# Patient Record
Sex: MALE | Race: OTHER | Employment: UNEMPLOYED | URBAN - METROPOLITAN AREA
[De-identification: names, ages, dates, MRNs, and addresses within clinical notes are randomized per-mention and may not be internally consistent; named-entity substitution may affect disease eponyms.]

---

## 2022-07-05 ENCOUNTER — APPOINTMENT (OUTPATIENT)
Dept: CT IMAGING | Age: 37
DRG: 385 | End: 2022-07-05
Payer: MEDICAID

## 2022-07-05 ENCOUNTER — HOSPITAL ENCOUNTER (INPATIENT)
Age: 37
LOS: 3 days | Discharge: HOME | DRG: 385 | End: 2022-07-08
Attending: EMERGENCY MEDICINE | Admitting: FAMILY MEDICINE
Payer: MEDICAID

## 2022-07-05 DIAGNOSIS — R22.2 CHEST MASS: ICD-10-CM

## 2022-07-05 DIAGNOSIS — R22.1 NECK MASS: Primary | ICD-10-CM

## 2022-07-05 LAB
A/G RATIO: 1 (ref 1.1–2.2)
ACANTHOCYTES: ABNORMAL
ALBUMIN SERPL-MCNC: 3.7 G/DL (ref 3.4–5)
ALP BLD-CCNC: 62 U/L (ref 40–129)
ALT SERPL-CCNC: 8 U/L (ref 10–40)
ANION GAP SERPL CALCULATED.3IONS-SCNC: 11 MMOL/L (ref 3–16)
AST SERPL-CCNC: 11 U/L (ref 15–37)
BASOPHILS ABSOLUTE: 0 K/UL (ref 0–0.2)
BASOPHILS RELATIVE PERCENT: 0.4 %
BILIRUB SERPL-MCNC: <0.2 MG/DL (ref 0–1)
BILIRUBIN URINE: NEGATIVE
BLOOD, URINE: NEGATIVE
BUN BLDV-MCNC: 8 MG/DL (ref 7–20)
CALCIUM SERPL-MCNC: 9.4 MG/DL (ref 8.3–10.6)
CHLORIDE BLD-SCNC: 104 MMOL/L (ref 99–110)
CLARITY: CLEAR
CO2: 26 MMOL/L (ref 21–32)
COLOR: YELLOW
CREAT SERPL-MCNC: 0.7 MG/DL (ref 0.9–1.3)
EOSINOPHILS ABSOLUTE: 0.2 K/UL (ref 0–0.6)
EOSINOPHILS RELATIVE PERCENT: 2.6 %
GFR AFRICAN AMERICAN: >60
GFR NON-AFRICAN AMERICAN: >60
GLUCOSE BLD-MCNC: 101 MG/DL (ref 70–99)
GLUCOSE URINE: NEGATIVE MG/DL
HCT VFR BLD CALC: 28.4 % (ref 40.5–52.5)
HEMATOLOGY PATH CONSULT: YES
HEMOGLOBIN: 9 G/DL (ref 13.5–17.5)
HYPOCHROMIA: ABNORMAL
KETONES, URINE: NEGATIVE MG/DL
LACTIC ACID: 1.2 MMOL/L (ref 0.4–2)
LEUKOCYTE ESTERASE, URINE: NEGATIVE
LYMPHOCYTES ABSOLUTE: 1.2 K/UL (ref 1–5.1)
LYMPHOCYTES RELATIVE PERCENT: 14.8 %
MCH RBC QN AUTO: 21.2 PG (ref 26–34)
MCHC RBC AUTO-ENTMCNC: 31.7 G/DL (ref 31–36)
MCV RBC AUTO: 66.9 FL (ref 80–100)
MICROCYTES: ABNORMAL
MICROSCOPIC EXAMINATION: NORMAL
MONOCYTES ABSOLUTE: 0.5 K/UL (ref 0–1.3)
MONOCYTES RELATIVE PERCENT: 6.3 %
NEUTROPHILS ABSOLUTE: 6.3 K/UL (ref 1.7–7.7)
NEUTROPHILS RELATIVE PERCENT: 75.9 %
NITRITE, URINE: NEGATIVE
OVALOCYTES: ABNORMAL
PDW BLD-RTO: 17.7 % (ref 12.4–15.4)
PH UA: 6 (ref 5–8)
PLATELET # BLD: 328 K/UL (ref 135–450)
PMV BLD AUTO: 6.9 FL (ref 5–10.5)
POLYCHROMASIA: ABNORMAL
POTASSIUM REFLEX MAGNESIUM: 3.6 MMOL/L (ref 3.5–5.1)
PROTEIN UA: NEGATIVE MG/DL
RBC # BLD: 4.25 M/UL (ref 4.2–5.9)
SARS-COV-2, NAAT: NOT DETECTED
SODIUM BLD-SCNC: 141 MMOL/L (ref 136–145)
SPECIFIC GRAVITY UA: 1.01 (ref 1–1.03)
TOTAL PROTEIN: 7.3 G/DL (ref 6.4–8.2)
URINE REFLEX TO CULTURE: NORMAL
URINE TYPE: NORMAL
UROBILINOGEN, URINE: 0.2 E.U./DL
WBC # BLD: 8.3 K/UL (ref 4–11)

## 2022-07-05 PROCEDURE — 87635 SARS-COV-2 COVID-19 AMP PRB: CPT

## 2022-07-05 PROCEDURE — 2580000003 HC RX 258: Performed by: EMERGENCY MEDICINE

## 2022-07-05 PROCEDURE — 83605 ASSAY OF LACTIC ACID: CPT

## 2022-07-05 PROCEDURE — 93005 ELECTROCARDIOGRAM TRACING: CPT | Performed by: EMERGENCY MEDICINE

## 2022-07-05 PROCEDURE — 99285 EMERGENCY DEPT VISIT HI MDM: CPT

## 2022-07-05 PROCEDURE — 2060000000 HC ICU INTERMEDIATE R&B

## 2022-07-05 PROCEDURE — 36415 COLL VENOUS BLD VENIPUNCTURE: CPT

## 2022-07-05 PROCEDURE — 80053 COMPREHEN METABOLIC PANEL: CPT

## 2022-07-05 PROCEDURE — 70491 CT SOFT TISSUE NECK W/DYE: CPT

## 2022-07-05 PROCEDURE — 71260 CT THORAX DX C+: CPT

## 2022-07-05 PROCEDURE — 81003 URINALYSIS AUTO W/O SCOPE: CPT

## 2022-07-05 PROCEDURE — 85025 COMPLETE CBC W/AUTO DIFF WBC: CPT

## 2022-07-05 PROCEDURE — 2580000003 HC RX 258: Performed by: NURSE PRACTITIONER

## 2022-07-05 PROCEDURE — 6360000004 HC RX CONTRAST MEDICATION: Performed by: EMERGENCY MEDICINE

## 2022-07-05 PROCEDURE — 6360000002 HC RX W HCPCS: Performed by: NURSE PRACTITIONER

## 2022-07-05 RX ORDER — ACETAMINOPHEN 325 MG/1
650 TABLET ORAL EVERY 6 HOURS PRN
Status: DISCONTINUED | OUTPATIENT
Start: 2022-07-05 | End: 2022-07-08 | Stop reason: HOSPADM

## 2022-07-05 RX ORDER — ONDANSETRON 2 MG/ML
4 INJECTION INTRAMUSCULAR; INTRAVENOUS EVERY 6 HOURS PRN
Status: DISCONTINUED | OUTPATIENT
Start: 2022-07-05 | End: 2022-07-08 | Stop reason: HOSPADM

## 2022-07-05 RX ORDER — 0.9 % SODIUM CHLORIDE 0.9 %
1000 INTRAVENOUS SOLUTION INTRAVENOUS ONCE
Status: COMPLETED | OUTPATIENT
Start: 2022-07-05 | End: 2022-07-05

## 2022-07-05 RX ORDER — ACETAMINOPHEN 650 MG/1
650 SUPPOSITORY RECTAL EVERY 6 HOURS PRN
Status: DISCONTINUED | OUTPATIENT
Start: 2022-07-05 | End: 2022-07-08 | Stop reason: HOSPADM

## 2022-07-05 RX ORDER — ONDANSETRON 4 MG/1
4 TABLET, ORALLY DISINTEGRATING ORAL EVERY 8 HOURS PRN
Status: DISCONTINUED | OUTPATIENT
Start: 2022-07-05 | End: 2022-07-08 | Stop reason: HOSPADM

## 2022-07-05 RX ORDER — ENOXAPARIN SODIUM 100 MG/ML
30 INJECTION SUBCUTANEOUS 2 TIMES DAILY
Status: DISCONTINUED | OUTPATIENT
Start: 2022-07-05 | End: 2022-07-08 | Stop reason: HOSPADM

## 2022-07-05 RX ORDER — SODIUM CHLORIDE 0.9 % (FLUSH) 0.9 %
5-40 SYRINGE (ML) INJECTION PRN
Status: DISCONTINUED | OUTPATIENT
Start: 2022-07-05 | End: 2022-07-08 | Stop reason: HOSPADM

## 2022-07-05 RX ORDER — SODIUM CHLORIDE 0.9 % (FLUSH) 0.9 %
5-40 SYRINGE (ML) INJECTION EVERY 12 HOURS SCHEDULED
Status: DISCONTINUED | OUTPATIENT
Start: 2022-07-05 | End: 2022-07-08 | Stop reason: HOSPADM

## 2022-07-05 RX ORDER — SODIUM CHLORIDE 9 MG/ML
INJECTION, SOLUTION INTRAVENOUS PRN
Status: DISCONTINUED | OUTPATIENT
Start: 2022-07-05 | End: 2022-07-08 | Stop reason: HOSPADM

## 2022-07-05 RX ORDER — POLYETHYLENE GLYCOL 3350 17 G/17G
17 POWDER, FOR SOLUTION ORAL DAILY PRN
Status: DISCONTINUED | OUTPATIENT
Start: 2022-07-05 | End: 2022-07-08 | Stop reason: HOSPADM

## 2022-07-05 RX ADMIN — IOPAMIDOL 100 ML: 755 INJECTION, SOLUTION INTRAVENOUS at 14:35

## 2022-07-05 RX ADMIN — SODIUM CHLORIDE, PRESERVATIVE FREE 10 ML: 5 INJECTION INTRAVENOUS at 22:06

## 2022-07-05 RX ADMIN — SODIUM CHLORIDE 1000 ML: 9 INJECTION, SOLUTION INTRAVENOUS at 13:52

## 2022-07-05 RX ADMIN — ENOXAPARIN SODIUM 30 MG: 100 INJECTION SUBCUTANEOUS at 22:05

## 2022-07-05 ASSESSMENT — PAIN - FUNCTIONAL ASSESSMENT: PAIN_FUNCTIONAL_ASSESSMENT: 0-10

## 2022-07-05 ASSESSMENT — ENCOUNTER SYMPTOMS
COUGH: 0
EYE REDNESS: 0
ABDOMINAL PAIN: 0
SHORTNESS OF BREATH: 0
EYE PAIN: 0
SORE THROAT: 0
BACK PAIN: 0
COLOR CHANGE: 0
ABDOMINAL DISTENTION: 0

## 2022-07-05 ASSESSMENT — PAIN SCALES - GENERAL: PAINLEVEL_OUTOF10: 1

## 2022-07-05 NOTE — ED NOTES
Report called to [facility] KAYODE JEFFERS at [address] at [facility]. Report also given to Neto Joseph in the ED at [facility], [facility]  07/05/22 [facility]  07/05/22 4827

## 2022-07-05 NOTE — ED NOTES
[facility] here for patient transport, report given to EMS. All questions answered. No change in pt's status.       Ysabel Vargas, RN  70/41/16 1920

## 2022-07-05 NOTE — ED NOTES
Out of room to [facility] 0166 via [facility] via stretcher.      Raine Yousif RN  92/01/47 1946

## 2022-07-05 NOTE — ED PROVIDER NOTES
[address] [place] [address]      Pt Name: Anastasia Carrillo  MRN: [id number]  [place] 1985  Date of evaluation: 7/5/2022  Provider: Viktoriya Puckett MD    [address] [place] [address]       Chief Complaint   Patient presents with    Rash     has a rash on the upper chest, across the neck, radiating to the back of the neck, and to the scalp as well, states first noticed 1 week ago    Mass     has an abcess to the left side of neck         HISTORY OF PRESENT ILLNESS   (Location/Symptom, Timing/Onset, Context/Setting, Quality, Duration, Modifying Factors, Severity)  Note limiting factors. Anastasia Carrillo is a 29 y.o. male who presents to the emergency department Complaining of mass in the left side of his neck. Patient stated started about 3 to 4 weeks ago. Is gotten progressively worse he noticed some flaky skin in his scalp 2 and now has a rash on his anterior chest wall right below the mass. Patient denies any chest pain or shortness of breath. Patient denies any fevers or chills. He denies any recent weight loss he has actually had a lot of weight gain since COVID started. Patient denies any fevers or chills. He specifically denies any IV drug use. Nursing Notes were reviewed. REVIEW OF SYSTEMS    (2-9 systems for level 4, 10 or more for level 5)     Review of Systems   Constitutional: Negative for chills and fever. HENT: Negative for congestion and sore throat. Eyes: Negative for pain and redness. Respiratory: Negative for cough and shortness of breath. Cardiovascular: Negative for chest pain. Gastrointestinal: Negative for abdominal distention and abdominal pain. Genitourinary: Negative for difficulty urinating and dysuria. Musculoskeletal: Negative for arthralgias and back pain. Skin: Positive for rash. Negative for color change. Neurological: Negative for dizziness, weakness and numbness.    Psychiatric/Behavioral: Negative for agitation and behavioral problems. Except as noted above the remainder of the review of systems was reviewed and negative. PAST MEDICAL HISTORY     Past Medical History:   Diagnosis Date    Depression     Mediastinal mass 2022    Obesity     Rash 2022    neck, scalp, anterior chest         SURGICAL HISTORY       Past Surgical History:   Procedure Laterality Date    CT BIOPSY LYMPH NODES SUPERFICIAL  2022    CT BIOPSY LYMPH NODES SUPERFICIAL 2022 [facility] CT         CURRENT MEDICATIONS       Discharge Medication List as of 2022  3:17 PM          ALLERGIES     Patient has no known allergies. FAMILY HISTORY       Family History   Problem Relation Age of Onset    Kidney Disease Father         on HD    Diabetes Father     Hypertension Father     Obesity Brother           SOCIAL HISTORY       Social History     Socioeconomic History    Marital status: Single     Spouse name: None    Number of children: None    Years of education: None    Highest education level: None   Occupational History    None   Tobacco Use    Smoking status: Former Smoker     Packs/day: 1.00     Years: 1.00     Pack years: 1.00     Start date: 2007     Quit date: 2008     Years since quittin.5    Smokeless tobacco: Never Used    Tobacco comment: last use in    Substance and Sexual Activity    Alcohol use: Not Currently     Comment: last use 5 years ago    Drug use: Not Currently     Types: Marijuana (Arlis Blunt)     Comment: last use 5 years ago    Sexual activity: None   Other Topics Concern    None   Social History Narrative    None     Social Determinants of Health     Financial Resource Strain:     Difficulty of Paying Living Expenses: Not on file   Food Insecurity:     Worried About Running Out of Food in the Last Year: Not on file    June of Food in the Last Year: Not on file   Transportation Needs:     Lack of Transportation (Medical):  Not on file    Lack of Transportation (Non-Medical): Not on file   Physical Activity:     Days of Exercise per Week: Not on file    Minutes of Exercise per Session: Not on file   Stress:     Feeling of Stress : Not on file   Social Connections:     Frequency of Communication with Friends and Family: Not on file    Frequency of Social Gatherings with Friends and Family: Not on file    Attends Yazdanism Services: Not on file    Active Member of [address] [place] [address] or Organizations: Not on file    Attends Club or Organization Meetings: Not on file    Marital Status: Not on file   Intimate Partner Violence:     Fear of Current or Ex-Partner: Not on file    Emotionally Abused: Not on file    Physically Abused: Not on file    Sexually Abused: Not on file   Housing Stability:     Unable to Pay for Housing in the Last Year: Not on file    Number of [place] in the Last Year: Not on file    Unstable Housing in the Last Year: Not on file       SCREENINGS         Hansa Coma Scale  Eye Opening: Spontaneous  Best Verbal Response: Oriented  Best Motor Response: Obeys commands  Parker Coma Scale Score: 15                     CIWA Assessment  BP: 118/73  Heart Rate: 98                 PHYSICAL EXAM    (up to 7 for level 4, 8 or more for level 5)     ED Triage Vitals [07/05/22 1235]   BP Temp Temp Source Heart Rate Resp SpO2 Height Weight   (!) 136/97 99.9 °F (37.7 °C) Oral (!) 120 -- 99 % 5' 5\" (1.651 m) 261 lb 3.9 oz (118.5 kg)       Physical Exam  Vitals and nursing note reviewed. Constitutional:       Appearance: Normal appearance. He is obese. HENT:      Head: Normocephalic and atraumatic. Right Ear: Tympanic membrane normal.      Left Ear: Tympanic membrane normal.      Nose: Nose normal.      Mouth/Throat:      Mouth: Mucous membranes are moist.      Pharynx: Oropharynx is clear. Eyes:      Extraocular Movements: Extraocular movements intact. Pupils: Pupils are equal, round, and reactive to light.    Neck:      Comments: Patient has a very hard large mass in the left supraclavicular region. It is very firm  Cardiovascular:      Rate and Rhythm: Normal rate and regular rhythm. Pulses: Normal pulses. Heart sounds: Normal heart sounds. Pulmonary:      Effort: Pulmonary effort is normal.      Breath sounds: Normal breath sounds. Abdominal:      General: Abdomen is flat. Bowel sounds are normal.      Palpations: Abdomen is soft. Musculoskeletal:         General: No tenderness. Normal range of motion. Cervical back: Normal range of motion. Skin:     General: Skin is warm and dry. Capillary Refill: Capillary refill takes less than 2 seconds. Neurological:      General: No focal deficit present. Mental Status: He is alert and oriented to person, place, and time. Psychiatric:         Mood and Affect: Mood normal.         Behavior: Behavior normal.         DIAGNOSTIC RESULTS     EKG: All EKG's are interpreted by the Emergency Department Physician who either signs or Co-signs this chart in the absence of a cardiologist.  The patient's EKG showed a sinus tachycardia with nondiagnostic ST and T wave changes no definite ischemia or injury. The patient had normal CT QRS QT and all axes were normal    RADIOLOGY:   Non-plain film images such as CT, Ultrasound and MRI are read by the radiologist. Plain radiographic images are visualized and preliminarily interpreted by the emergency physician with the below findings:        Interpretation per the Radiologist below, if available at the time of this note:    XR CHEST 1 VIEW   Final Result   No pneumothorax is seen status post biopsy. CT BIOPSY LYMPH NODES SUPERFICIAL   Final Result   Successful CT guided core biopsy anterior mediastinal mass. RECOMMENDATIONS:   Unavailable         CT GUIDED NEEDLE PLACEMENT   Final Result   Successful CT guided core biopsy anterior mediastinal mass.       RECOMMENDATIONS:   Unavailable         CT ABDOMEN PELVIS W IV CONTRAST Additional Contrast? Oral   Final Result   1. No acute findings within the abdomen or pelvis. No pathologic adenopathy   is identified. 2. Mild splenomegaly. 3. Mild hepatic steatosis. CT CHEST W CONTRAST   Final Result   Abnormal soft tissue density seen in the mediastinum, inseparable from the   thymus gland, raising the question of thymoma. Lymphoma would also be in the   differential.  Teratoma is considered less likely, given lack of   calcification internally      RECOMMENDATIONS:   Unavailable         CT SOFT TISSUE NECK W CONTRAST   Final Result   1. Amorphous soft tissue attenuation seen along the left jugular chain lymph   nodes involving is the left sternocleidomastoid muscle with mildly enlarged   left jugular chain and left submental lymph nodes. There is perhaps minimal   asymmetric skin thickening overlying the left sternocleidomastoid muscle. 2. There are multiple abnormal appearing right supraclavicular lymph nodes. 3. No discrete/drainable fluid collection is definitively identified of the   neck. 4. Soft tissue attenuation seen in the anterior mediastinum with enlarged   mediastinal lymph nodes. Please refer to the CT chest for further evaluation.                ED BEDSIDE ULTRASOUND:   Performed by ED Physician - none    LABS:  Labs Reviewed   COMPREHENSIVE METABOLIC PANEL W/ REFLEX TO MG FOR LOW K - Abnormal; Notable for the following components:       Result Value    Glucose 101 (*)     CREATININE 0.7 (*)     Albumin/Globulin Ratio 1.0 (*)     ALT 8 (*)     AST 11 (*)     All other components within normal limits   CBC WITH AUTO DIFFERENTIAL - Abnormal; Notable for the following components:    Hemoglobin 9.0 (*)     Hematocrit 28.4 (*)     MCV 66.9 (*)     MCH 21.2 (*)     RDW 17.7 (*)     Microcytes 2+ (*)     Polychromasia Occasional (*)     Hypochromia 2+ (*)     Acanthocytes Occasional (*)     Ovalocytes Occasional (*)     All other components within normal limits   CBC WITH AUTO DIFFERENTIAL - Abnormal; Notable for the following components:    RBC 4.15 (*)     Hemoglobin 8.9 (*)     Hematocrit 28.1 (*)     MCV 67.9 (*)     MCH 21.5 (*)     RDW 18.0 (*)     All other components within normal limits   COMPREHENSIVE METABOLIC PANEL - Abnormal; Notable for the following components:    Glucose 113 (*)     CREATININE 0.7 (*)     Albumin 3.1 (*)     Albumin/Globulin Ratio 0.9 (*)     ALT 6 (*)     AST 11 (*)     All other components within normal limits   RETICULOCYTES - Abnormal; Notable for the following components:    Immature Retic Fract 0.65 (*)     Hematocrit 32.2 (*)     All other components within normal limits   IRON AND TIBC - Abnormal; Notable for the following components:    Iron 28 (*)     Iron Saturation 8 (*)     All other components within normal limits   HAPTOGLOBIN - Abnormal; Notable for the following components:    Haptoglobin 452.0 (*)     All other components within normal limits   SEDIMENTATION RATE - Abnormal; Notable for the following components:    Sed Rate 128 (*)     All other components within normal limits    Narrative:     Sedimentation Rate was cancelled on 07/06/2022 at 11:40 by Michelle Roe; Rejected:  Quantity not sufficient   PROTIME-INR - Abnormal; Notable for the following components:    Protime 15.3 (*)     INR 1.21 (*)     All other components within normal limits   COVID-19, RAPID   LACTIC ACID   URINALYSIS WITH REFLEX TO CULTURE   HCG, QUANTITATIVE, PREGNANCY   LACTATE DEHYDROGENASE   URIC ACID   AFP TUMOR MARKER   HCG, TUMOR MARKER   FLOW CYTOMETRY LEUKEMIA/LYMPHOMA NODES OR FLUIDS    Narrative:                                                        [facility]                                                     [address] [address] Fax [phone]                                                     [facility] [phone]  Department of Pathology  FINAL FLOW CYTOMETRY REPORT  Patient       Jenna Ventura         Accession     [id number]  Name:                                       No:   Age Sex:  1985    29 Y / M        Location:     [facility] 01  Account No:   [de-identified]                   Collected:    2022  Med Rec No:   [id number]                  Received:     2022  Attend Phys:  TRINA VINCENT DO                 Completed:    2022  Request       TRINA VINCENT DO  Phys:      INTERPRETATION:    Mediastinal Mass: No phenotypic evidence of non-Hodgkin lymphoma. (Results should be interpreted with caution due to low cell count and  viability in the sample submitted.)    Suggest correlation of phenotype with clinical morphologic and other  pertinent laboratory findings, as well as other ancillary studies, as  appropriate. CLINICAL INFORMATION:  Clinical Diagnosis: Thoracic and neck mass  Specimen: Mediastinal mass  Pathology Case #: [id number]  Cell Count: 0.5 x 105  Viability: 0%    DATA ANALYSIS: :  Lymphocytes (rare)  B-cells are polyclonal and T-cells are heterogeneous with no aberrant  antigen loss or expression. CD4/CD8 ratio is 2.96.    ANTIBODIES USED/RESULTS:  FMC7 [*], CD10 [*], CD19 [*], CD20 [*],CD23 [*], Kappa light chain [*],  Lambda light chain [*], CD2 [*], CD3 [*], CD4 [*], CD5 [*], CD7 [*], CD8  [*], CD16 [*], CD56 [*], CD38 [*], HLA-DR [*]    * = No abnormal population identified    Note: These tests were developed and their performance characteristics  determined by [facility]. They have not been cleared or  approved by the U.S. Food and Drug Administration. The FDA has  determined that such clearance or approval is not necessary.     Case signed out at [facility],  [facility] [address] [address]  Technical processing at [facility], [address] [facility] [place] [address]  Phone [phone]  CPT: Marge Lee M.D., PH.D.  (Electronic Signature)  2022                                                                     Page 1 of 1   FERRITIN   VITAMIN B12 & FOLATE   BLOOD SMEAR REVIEW   SURGICAL PATHOLOGY    Narrative:                                          [facility]                                       [address] [facility] [place] [address]                                       Fax [phone]   Ph. [phone]  Department of Pathology  FINAL SURGICAL PATHOLOGY REPORT  Patient Name:  Shahida Cho       Accession No:  [id number]   Age Sex:   1985    29 Y / M      Location:      [facility] 01  Account No:    [de-identified]                 Collected:     2022  Med Rec No:    [id number]                Received:      2022  Attend Phys:   TRINA VINCENT DO               Completed:     2022  Perform Phys:  Christel Lombardi MD           FINAL DIAGNOSIS:    Mediastinal mass, core biopsy:  -Hyalinizing fibrosis. See comment. COMMENT:      The core biopsies show dense hyaline fibrosis with  scattered small lymphocytes that are composed of mixed CD3 positive T  lymphocytes and CD20 positive B cells. Keratin (AE1/AE3) and CD30 are  negative. The findings raise a differential diagnosis of sclerosing  mediastinitis. Reactive change to a neoplastic process is also in the  differential diagnosis. Clinical correlation is recommended. HARJINDER/HARJINDER        Preoperative Diagnosis:  Thoracic and neck mass  Postoperative Diagnosis:   Thoracic and neck mass    SPECIMEN:  MEDIASTINAL MASS BX     GROSS DESCRIPTION:   Specimen is received in formalin labeled with the  patient's name and \"mediastinal mass\" and consists of multiple minuscule  tissue fragments ranging from 0.1 to 0.2 cm.  Filtered and submitted in  toto in one cassette. IMMEDIATE EVALUATION:  Pass #1:  Hypocellular. Pass #2:  Hypocellar and flow. KEI Kaiser/RAFI     MICROSCOPIC DESCRIPTION:  Microscopic examination performed. See  diagnosis and comment. All controls show appropriate reactivity. All immunohistochemical/cytochemical stains (IHC) are performed on  separate slides per different antibody unless otherwise specified in the  documentation that a cocktail (multiple stain) was performed. ANALYTE  SPECIFIC REAGENT (ASR) DISCLAIMER**  The use of one or more reagents in the above mentioned tests is regulated  as an analyte specific reagent (ASR). These tests were developed and  their performance characteristics determined by the clinical Laboratories  of [address] [place] [address] has determined that such clearance  or approval is not necessary. .    CPT: 40571 X1   46730 X3   10109 X1   27705 L8   12675 X1  Copy to: Paula Pagan MD  Case signed out at [facility],  [facility] [facility] [place] [address]  Technical processing at [facility], [facility] [facility] [address] [facility] [address] [place] [address]  Phone [phone]        Myles Barajas M.D., PH.D.  (Electronic Signature)  07/11/2022                                                                     Page 1 of 1   FLOW CYTOMETRY LEUKEMIA/LYMPHOMA NODES OR FLUIDS   DIRECT ANTIGLOBULIN TEST       All other labs were within normal range or not returned as of this dictation. EMERGENCY DEPARTMENT COURSE and DIFFERENTIAL DIAGNOSIS/MDM:   Vitals:    Vitals:    07/08/22 1037 07/08/22 1040 07/08/22 1132 07/08/22 1345   BP: 120/68 122/65 118/73    Pulse: (!) 106 (!) 104 98    Resp: 18 16 16    Temp:   99.1 °F (37.3 °C)    TempSrc:   Oral    SpO2: 96% 97% 99% 99%   Weight:       Height:         Is this patient to be included in the SEP-1 Core Measure due to severe sepsis or septic shock?    No   Exclusion criteria - the patient is NOT to be included for SEP-1 Core Measure due to: Infection is not suspected     MDM  Number of Diagnoses or Management Options  Chest mass  Neck mass  Diagnosis management comments: The patient had the neck mass and chest mass described above. The patient has positive lymph nodes on the right as well as the mass on the left and I am concerned about ultimate airway compromise although at this time he has no airway compromise. The patient was rechecked multiple times and maintained his airway here. The patient discussed with Dr. Niels Padilla who agreed to admit the patient to the hospitalist service and take care of all appropriate consultation she did asked me to test patient for COVID and that test has been sent        REASSESSMENT      Patient was reassessed multiple times    CRITICAL CARE TIME   Total Critical Care time was 35 minutes, excluding separately reportable procedures. There was a high probability of clinically significant/life threatening deterioration in the patient's condition which required my urgent intervention. The patient was continually reassessed for airway compromise I had to speak with the consultants to interpret all the films and labs and the total time was 35 minutes    CONSULTS:  IP CONSULT TO HOSPITALIST  IP CONSULT TO ONCOLOGY  IP CONSULT TO SOCIAL WORK  IP CONSULT TO CARDIOTHORACIC SURGERY    PROCEDURES:  Unless otherwise noted below, none     Procedures    FINAL IMPRESSION      1. Neck mass    2.  Chest mass          DISPOSITION/PLAN   Admit    PATIENT REFERRED TO:  Katelin Benitez MD  [address] [phone]    Call  Call for lymph node biopsy, then will follow up with  hematology     Hematology    Schedule an appointment as soon as possible for a visit      Gurdeep Thompson, PhD  Sadie Cantor [address] [place] [address]  [phone]    Go on 7/29/2022  at 9:20 AM  (Please arrive by 9:05 AM)      DISCHARGE MEDICATIONS:  Discharge Medication List as of 7/8/2022  3:17 PM        Controlled Substances Monitoring:     No flowsheet data found. (Please note that portions of this note were completed with a voice recognition program.  Efforts were made to edit the dictations but occasionally words are mis-transcribed. )    Monica Chen MD (electronically signed)  Attending Emergency Physician            Good Hood MD  07/05/22 1 Vince Seymour II, MD  07/11/22 8129

## 2022-07-06 ENCOUNTER — APPOINTMENT (OUTPATIENT)
Dept: CT IMAGING | Age: 37
DRG: 385 | End: 2022-07-06
Payer: MEDICAID

## 2022-07-06 LAB
A/G RATIO: 0.9 (ref 1.1–2.2)
ALBUMIN SERPL-MCNC: 3.1 G/DL (ref 3.4–5)
ALP BLD-CCNC: 60 U/L (ref 40–129)
ALT SERPL-CCNC: 6 U/L (ref 10–40)
ANION GAP SERPL CALCULATED.3IONS-SCNC: 12 MMOL/L (ref 3–16)
AST SERPL-CCNC: 11 U/L (ref 15–37)
BASOPHILS ABSOLUTE: 0 K/UL (ref 0–0.2)
BASOPHILS RELATIVE PERCENT: 0.3 %
BILIRUB SERPL-MCNC: <0.2 MG/DL (ref 0–1)
BUN BLDV-MCNC: 8 MG/DL (ref 7–20)
CALCIUM SERPL-MCNC: 8.7 MG/DL (ref 8.3–10.6)
CHLORIDE BLD-SCNC: 102 MMOL/L (ref 99–110)
CO2: 25 MMOL/L (ref 21–32)
CREAT SERPL-MCNC: 0.7 MG/DL (ref 0.9–1.3)
DAT POLYSPECIFIC: NORMAL
EKG ATRIAL RATE: 102 BPM
EKG DIAGNOSIS: NORMAL
EKG P AXIS: 45 DEGREES
EKG P-R INTERVAL: 120 MS
EKG Q-T INTERVAL: 338 MS
EKG QRS DURATION: 84 MS
EKG QTC CALCULATION (BAZETT): 440 MS
EKG R AXIS: 58 DEGREES
EKG T AXIS: 25 DEGREES
EKG VENTRICULAR RATE: 102 BPM
EOSINOPHILS ABSOLUTE: 0.2 K/UL (ref 0–0.6)
EOSINOPHILS RELATIVE PERCENT: 2.2 %
FERRITIN: 86.6 NG/ML (ref 30–400)
FOLATE: 9.28 NG/ML (ref 4.78–24.2)
GFR AFRICAN AMERICAN: >60
GFR NON-AFRICAN AMERICAN: >60
GLUCOSE BLD-MCNC: 113 MG/DL (ref 70–99)
GONADOTROPIN, CHORIONIC (HCG) QUANT: <5 MIU/ML
HAPTOGLOBIN: 452 MG/DL (ref 30–200)
HCT VFR BLD CALC: 28.1 % (ref 40.5–52.5)
HCT VFR BLD CALC: 32.2 % (ref 40.5–52.5)
HEMATOLOGY PATH CONSULT: NO
HEMOGLOBIN: 8.9 G/DL (ref 13.5–17.5)
IMMATURE RETIC FRACT: 0.65 (ref 0.21–0.37)
INR BLD: 1.21 (ref 0.87–1.14)
IRON SATURATION: 8 % (ref 20–50)
IRON: 28 UG/DL (ref 59–158)
LACTATE DEHYDROGENASE: 185 U/L (ref 100–190)
LYMPHOCYTES ABSOLUTE: 1.3 K/UL (ref 1–5.1)
LYMPHOCYTES RELATIVE PERCENT: 15.4 %
MCH RBC QN AUTO: 21.5 PG (ref 26–34)
MCHC RBC AUTO-ENTMCNC: 31.7 G/DL (ref 31–36)
MCV RBC AUTO: 67.9 FL (ref 80–100)
MONOCYTES ABSOLUTE: 0.6 K/UL (ref 0–1.3)
MONOCYTES RELATIVE PERCENT: 7.2 %
NEUTROPHILS ABSOLUTE: 6.4 K/UL (ref 1.7–7.7)
NEUTROPHILS RELATIVE PERCENT: 74.9 %
PDW BLD-RTO: 18 % (ref 12.4–15.4)
PLATELET # BLD: 325 K/UL (ref 135–450)
PMV BLD AUTO: 6.6 FL (ref 5–10.5)
POTASSIUM SERPL-SCNC: 3.7 MMOL/L (ref 3.5–5.1)
PROTHROMBIN TIME: 15.3 SEC (ref 11.7–14.5)
RBC # BLD: 4.15 M/UL (ref 4.2–5.9)
RETICULOCYTE ABSOLUTE COUNT: 0.09 M/UL
RETICULOCYTE COUNT PCT: 1.92 % (ref 0.5–2.18)
SEDIMENTATION RATE, ERYTHROCYTE: 128 MM/HR (ref 0–15)
SODIUM BLD-SCNC: 139 MMOL/L (ref 136–145)
TOTAL IRON BINDING CAPACITY: 355 UG/DL (ref 260–445)
TOTAL PROTEIN: 6.6 G/DL (ref 6.4–8.2)
URIC ACID, SERUM: 6.3 MG/DL (ref 3.5–7.2)
VITAMIN B-12: 553 PG/ML (ref 211–911)
WBC # BLD: 8.6 K/UL (ref 4–11)

## 2022-07-06 PROCEDURE — 84704 HCG FREE BETACHAIN TEST: CPT

## 2022-07-06 PROCEDURE — 83550 IRON BINDING TEST: CPT

## 2022-07-06 PROCEDURE — 85025 COMPLETE CBC W/AUTO DIFF WBC: CPT

## 2022-07-06 PROCEDURE — 6360000002 HC RX W HCPCS: Performed by: NURSE PRACTITIONER

## 2022-07-06 PROCEDURE — 84702 CHORIONIC GONADOTROPIN TEST: CPT

## 2022-07-06 PROCEDURE — 80053 COMPREHEN METABOLIC PANEL: CPT

## 2022-07-06 PROCEDURE — 82105 ALPHA-FETOPROTEIN SERUM: CPT

## 2022-07-06 PROCEDURE — 2060000000 HC ICU INTERMEDIATE R&B

## 2022-07-06 PROCEDURE — 82746 ASSAY OF FOLIC ACID SERUM: CPT

## 2022-07-06 PROCEDURE — 83010 ASSAY OF HAPTOGLOBIN QUANT: CPT

## 2022-07-06 PROCEDURE — 99255 IP/OBS CONSLTJ NEW/EST HI 80: CPT | Performed by: THORACIC SURGERY (CARDIOTHORACIC VASCULAR SURGERY)

## 2022-07-06 PROCEDURE — 84550 ASSAY OF BLOOD/URIC ACID: CPT

## 2022-07-06 PROCEDURE — 6360000004 HC RX CONTRAST MEDICATION: Performed by: FAMILY MEDICINE

## 2022-07-06 PROCEDURE — 86880 COOMBS TEST DIRECT: CPT

## 2022-07-06 PROCEDURE — 36415 COLL VENOUS BLD VENIPUNCTURE: CPT

## 2022-07-06 PROCEDURE — 74177 CT ABD & PELVIS W/CONTRAST: CPT

## 2022-07-06 PROCEDURE — 93010 ELECTROCARDIOGRAM REPORT: CPT | Performed by: INTERNAL MEDICINE

## 2022-07-06 PROCEDURE — 82728 ASSAY OF FERRITIN: CPT

## 2022-07-06 PROCEDURE — 83540 ASSAY OF IRON: CPT

## 2022-07-06 PROCEDURE — 2580000003 HC RX 258: Performed by: NURSE PRACTITIONER

## 2022-07-06 PROCEDURE — 82607 VITAMIN B-12: CPT

## 2022-07-06 PROCEDURE — 83615 LACTATE (LD) (LDH) ENZYME: CPT

## 2022-07-06 PROCEDURE — 94760 N-INVAS EAR/PLS OXIMETRY 1: CPT

## 2022-07-06 PROCEDURE — 85610 PROTHROMBIN TIME: CPT

## 2022-07-06 PROCEDURE — 85045 AUTOMATED RETICULOCYTE COUNT: CPT

## 2022-07-06 PROCEDURE — 6360000004 HC RX CONTRAST MEDICATION: Performed by: HOSPITALIST

## 2022-07-06 PROCEDURE — 85652 RBC SED RATE AUTOMATED: CPT

## 2022-07-06 RX ADMIN — IOPAMIDOL 75 ML: 755 INJECTION, SOLUTION INTRAVENOUS at 12:25

## 2022-07-06 RX ADMIN — ENOXAPARIN SODIUM 30 MG: 100 INJECTION SUBCUTANEOUS at 20:41

## 2022-07-06 RX ADMIN — IOHEXOL 50 ML: 240 INJECTION, SOLUTION INTRATHECAL; INTRAVASCULAR; INTRAVENOUS; ORAL at 10:50

## 2022-07-06 RX ADMIN — SODIUM CHLORIDE, PRESERVATIVE FREE 10 ML: 5 INJECTION INTRAVENOUS at 20:41

## 2022-07-06 RX ADMIN — SODIUM CHLORIDE, PRESERVATIVE FREE 10 ML: 5 INJECTION INTRAVENOUS at 10:06

## 2022-07-06 ASSESSMENT — PAIN SCALES - GENERAL: PAINLEVEL_OUTOF10: 0

## 2022-07-06 NOTE — PLAN OF CARE
Problem: Discharge Planning  Goal: Discharge to home or other facility with appropriate resources  Outcome: Progressing  Flowsheets (Taken 7/5/2022 2010)  Discharge to home or other facility with appropriate resources:   Identify barriers to discharge with patient and caregiver   Arrange for needed discharge resources and transportation as appropriate   Identify discharge learning needs (meds, wound care, etc)     Problem: Pain  Goal: Verbalizes/displays adequate comfort level or baseline comfort level  Outcome: Progressing     Problem: ABCDS Injury Assessment  Goal: Absence of physical injury  Outcome: Progressing     Problem: Skin/Tissue Integrity - Adult  Goal: Skin integrity remains intact  Outcome: Progressing  Flowsheets (Taken 7/5/2022 2011)  Skin Integrity Remains Intact:   Monitor for areas of redness and/or skin breakdown   Assess vascular access sites hourly

## 2022-07-06 NOTE — CONSULTS
Consultation H&P    Date of Admission:  7/5/2022 12:31 PM  Date of Consultation:  7/7/2022    PCP:  No primary care provider on file. Onc/Heme: Tanner Garnett    Chief Complaint: mediastinal mass    History of Present Illness: We are asked to see this patient in consultation by Dr. Tanner Garnett regarding care and treatment of mediastinal mass. Александр Mccall is a 29 y.o. male admitted through [facility] ED on  7/5/2022 with c/o left sided neck lump with associated pain, chest, neck and scalp rash. Neck pain occurs when the patient lies on that side while attempting to sleep. Rash has been present for approximately 1.5 weeks. Left neck swelling has been present for the past 3-4weeks. Denies weight loss, fatigue, SOB, difficulty swallowing. CT neck and chest show density in the mediastinum, amorphous soft tissue attenuation along the left jugular chain lymph nodes involving the left sternocleidomastoid muscle with mildly enlarged left jugular chain and left submental lymph nodes. Oncology consulted. CT biopsy ordered. Past Medical History:  Past Medical History:   Diagnosis Date    Depression     Mediastinal mass 07/05/2022    Obesity     Rash 07/05/2022    neck, scalp, anterior chest       Past Surgical History:  History reviewed. No pertinent surgical history. 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 Caffeine: Moderate; tea and coffee  Lifestyle:  Sedentary; especially since the pandemic.  Lives with his father and cousin  Social History     Socioeconomic History    Marital status: Single     Spouse name: Not on file    Number of children: Not on file    Years of education: Not on file    Highest education level: Not on file   Occupational History    Not on file   Tobacco Use    Smoking status: Former Smoker     Packs/day: 1.00     Years: 1.00     Pack years: 1.00     Start date: 2007     Quit date: 2008     Years since quittin.5    Smokeless tobacco: Never Used    Tobacco comment: last use in    Substance and Sexual Activity    Alcohol use: Not Currently     Comment: last use 5 years ago    Drug use: Not Currently     Types: Marijuana (Weed)     Comment: last use 5 years ago    Sexual activity: Not on file   Other Topics Concern    Not on file   Social History Narrative    Not on file     Social Determinants of Health     Financial Resource Strain:     Difficulty of Paying Living Expenses: Not on file   Food Insecurity:     Worried About [address] in the Last Year: Not on file    June of Food in the Last Year: Not on file   Transportation Needs:     Lack of Transportation (Medical): Not on file    Lack of Transportation (Non-Medical):  Not on file   Physical Activity:     Days of Exercise per Week: Not on file    Minutes of Exercise per Session: Not on file   Stress:     Feeling of Stress : Not on file   Social Connections:     Frequency of Communication with Friends and Family: Not on file    Frequency of Social Gatherings with Friends and Family: Not on file    Attends [employer] Services: Not on file    Active Member of [address] [place] [address] or Organizations: Not on file    Attends Club or Organization Meetings: Not on file    Marital Status: Not on file   Intimate Partner Violence:     Fear of Current or Ex-Partner: Not on file    Emotionally Abused: Not on file    Physically Abused: Not on file    Sexually Abused: Not on file   Housing Stability:     Unable to Pay for Housing in the Last Year: Not on file    Number of [place] in the Last Year: Not on file    Unstable Housing in the Last Year: Not on file Family History:      Problem Relation Age of Onset    Kidney Disease Father         on HD    Diabetes Father     Hypertension Father     Obesity Brother        Review of Systems:  Reviewed, negative unless noted  Constitutional: weight change, weakness, impairment of ADLs  Eyes: eyestrain, redness, discharges  ENMT: post nasal drip, sinus pain, discharge, 3 cm by 1 cm swollen lymph node left medial neck  Cardiovascular: faintness, vertigo, color changes in fingers/toes  Respiratory: cough, sputum, hemoptysis  GI: excessive thirst, changes in bowel habits, abdominal swelling  : painful urination, pyuria, incontinence  Musculoskeletal: cramps, swelling, limitation of motor activity  Integumentary: cyanosis, changes in skin, dryness, flaky, white rash noted anterior chest, back of neck and into scalp  Neurological: paralysis, tingling, tremors  Psychiatric: restlessness, irritability, mood swings  Endocrine: heat/cold intolerance, excessive sweating, hair loss  Hematologic/lymphatic: swollen glands, anemia, easy bruising/bleeding      Physical Examination:    /82   Pulse 98   Temp 98.8 °F (37.1 °C) (Oral)   Resp 16   Ht 5' 5\" (1.651 m)   Wt 261 lb 7.5 oz (118.6 kg)   SpO2 97%   BMI 43.51 kg/m²      Admission Weight: 261 lb 3.9 oz (118.5 kg)   Hand dominance: Right     General appearance: NAD, well nourished  Eyes: anicteric, PERRLA  ENMT: no scars or lesions, no nasal deformity, normal dentition, no cyanosis of oral mucosa  Neck: no masses, no thyroid enlargement, no JVD. Respiratory: effort is unlabored, symmetric, no crackles, wheezes or rubs. No palpable/percussable abnormalities. Cardiovascular: regular, no murmur. PMI normal, no thrill. No carotid bruits. No edema or varicosities. Abdominal aorta cannot be appreciated given body habitus. Pulses:    carotid brachial radial femoral popliteal DP PT   RIGHT   2+   2+ 2+   LEFT   2+   2+ 2+   GI: abdomen soft, nondistended, no organomegaly. No masses. Lymphatic: no cervical/supraclavicular adenopathy  Musculoskeletal: strength and tone normal. Full ROM. No scoliosis. Extremities: warm and pink. No clubbing or petechiae. Skin: no dermatitis or ulceration. No nodularity or induration. Neuro: CN grossly intact. Sensation and motor function grossly intact. Psychiatric: oriented, appropriate mood/affect. MEDICAL DECISION MAKING/TESTING  Studies personally reviewed. CT Soft tissue neck with contrast  7/5/2022  Amorphous soft tissue attenuation seen along the left jugular chain lymph nodes involving the left sternocleidomastoid muscle with mildly enlarged left jugular chain and left submental lymph nodes. There is perhaps minimal asymmetric skin thickening overlying the left sternocleidomastoid muscle. There are multiple abnormal appearing right supraclavicular lymph nodes. No discrete/drainable fluid collection is definitively identified of the neck. Soft tissue attenuation seen in the anterior mediastinum with enlarged mediastinal lymph nodes. CT chest with contrast  7/5/2022  Abnormal soft tissue density seen in the mediastinum, inseparable from the thymus gland, raising the question of thymoma. Lymphoma would also be in the differential.     CT Abdomen Pelvis  7/6/2022  No acute findings within the abdomen or pelvis. No pathologic adenopathy is identified, mild splenomegaly,mild hepatic steatosis. Labs:   CBC:   Recent Labs     07/05/22  1346 07/06/22  0624 07/06/22  0946   WBC 8.3 8.6  --    HGB 9.0* 8.9*  --    HCT 28.4* 28.1* 32.2*   MCV 66.9* 67.9*  --     325  --      BMP:   Recent Labs     07/05/22  1346 07/06/22  0624    139   K 3.6 3.7    102   CO2 26 25   BUN 8 8   CREATININE 0.7* 0.7*   CALCIUM 9.4 8.7     Cardiac Enzymes: No results for input(s): CKTOTAL, CKMB, CKMBINDEX, TROPONINI in the last 72 hours.   PT/INR:   Recent Labs     07/06/22  1111   PROTIME 15.3*   INR 1.21*     APTT: No results for input(s): APTT in the last 72 hours. Liver Profile:  Lab Results   Component Value Date/Time    AST 11 07/06/2022 06:24 AM    ALT 6 07/06/2022 06:24 AM    BILITOT <0.2 07/06/2022 06:24 AM    ALKPHOS 60 07/06/2022 06:24 AM    LABALBU 3.1 07/06/2022 06:24 AM     No results found for: CHOL, HDL, TRIG  HgbA1c:  No results found for: LABA1C  UA:   Lab Results   Component Value Date/Time    COLORU Yellow 07/05/2022 03:25 PM    PHUR 6.0 07/05/2022 03:25 PM    CLARITYU Clear 07/05/2022 03:25 PM    SPECGRAV 1.010 07/05/2022 03:25 PM    LEUKOCYTESUR Negative 07/05/2022 03:25 PM    UROBILINOGEN 0.2 07/05/2022 03:25 PM    BILIRUBINUR Negative 07/05/2022 03:25 PM    BLOODU Negative 07/05/2022 03:25 PM    GLUCOSEU Negative 07/05/2022 03:25 PM       History obtained: chart, pt    Diagnosis:  Mediastinal mass, left medial neck lymph node swelling 3 cm long by 1 cm wide    Plan:   CT guided mediastinal biopsy per IR tomorrow. NPO at midnight  Hold KIAN Rowley-CNP  Reviewed radiography with Dr. Loulou Malloy. He believes he can biopsy intrathoracic mass. Note reviewed, events of last 24 hours reviewed along with vital signs and I/Os and patient examined. Assessment and plans discussed and are as outlined above.      Padma Murphy MD, FACS, [facility], FACCP, [place]

## 2022-07-06 NOTE — H&P
Hospital Medicine History & Physical      PCP: No primary care provider on file. Date of Admission: 7/5/2022    Date of Service: Pt seen/examined on 7/5/2022 and Admitted to Inpatient with expected LOS greater than two midnights due to medical therapy. Chief Complaint:  Rash to chest and abscess to left neck      History Of Present Illness:      29 y.o. male with PMHx of depression presented to [facility] in transfer from [facility] ED for evaluation of left neck mass and chest mass. Patient presented to [address] [place] [address] emergency department with rash to the upper chest, neck and in the scalp which he noted about 2 weeks ago. Patient also reports mass to the left side of his neck which has been present about 3 to 4 weeks. He denies pain to this area. No redness or warmth. No drainage. Patient has no chest pain or shortness of breath. No fever, chills or body aches. No recent weight loss. Past Medical History:          Diagnosis Date    Depression        Past Surgical History:      History reviewed. No pertinent surgical history. Medications Prior to Admission:      Prior to Admission medications    Not on File       Allergies:  Patient has no known allergies. Social History:      TOBACCO:   reports that he has quit smoking. He has never used smokeless tobacco.  ETOH:   reports previous alcohol use. Family History:      Reviewed in detail positive as follows:    History reviewed. No pertinent family history. REVIEW OF SYSTEMS:   Pertinent positives as noted in the HPI. All other systems reviewed and negative. PHYSICAL EXAM PERFORMED:    /81   Pulse 97   Temp 98.8 °F (37.1 °C) (Oral)   Resp 18   Ht 5' 5\" (1.651 m)   Wt 261 lb 3.9 oz (118.5 kg)   SpO2 97%   BMI 43.47 kg/m²     General appearance:  Well developed, well nourished, male sitting upright in bedside chair in no apparent distress, appears stated age and cooperative.   HEENT:  Normal cephalic, atraumatic without obvious deformity. Pupils equal, round, and reactive to light. Conjunctivae/corneas clear. Neck: Supple, with full range of motion. No jugular venous distention. Trachea midline. Large firm mass left supraclavicular region. No redness/warmth. No pain  Respiratory:  Normal respiratory effort. Clear to auscultation bilaterally without accessory muscle use. Cardiovascular:  Regular rate and rhythm without murmurs, no lower extremity edema. Abdomen: Soft, non-tender, non-distended, without rebound or guarding. Normal bowel sounds. Musculoskeletal:  Moves all extremities equally. Full range of motion without deformity. Skin: Pustular scaly rash noted to chest neck and scalp otherwise skin warm, dry and intact. No rashes or lesions. Neurologic:  Neurovascularly intact without any focal sensory/motor deficits. Cranial nerves: II-XII intact, grossly non-focal.  Psychiatric:  Alert and oriented, thought content appropriate, normal insight  Capillary Refill: Brisk,< 3 seconds   Peripheral Pulses: +2 palpable, equal bilaterally       Labs:     Recent Labs     07/05/22  1346   WBC 8.3   HGB 9.0*   HCT 28.4*        Recent Labs     07/05/22  1346      K 3.6      CO2 26   BUN 8   CREATININE 0.7*   CALCIUM 9.4     Recent Labs     07/05/22  1346   AST 11*   ALT 8*   BILITOT <0.2   ALKPHOS 62     No results for input(s): INR in the last 72 hours. No results for input(s): Emperatriz Highman in the last 72 hours. Urinalysis:      Lab Results   Component Value Date/Time    NITRU Negative 07/05/2022 03:25 PM    BLOODU Negative 07/05/2022 03:25 PM    SPECGRAV 1.010 07/05/2022 03:25 PM    GLUCOSEU Negative 07/05/2022 03:25 PM       Radiology:       CT CHEST W CONTRAST   Final Result   Abnormal soft tissue density seen in the mediastinum, inseparable from the   thymus gland, raising the question of thymoma.   Lymphoma would also be in the   differential.  Teratoma is considered less likely, given lack of   calcification internally      RECOMMENDATIONS:   Unavailable         CT SOFT TISSUE NECK W CONTRAST   Final Result   1. Amorphous soft tissue attenuation seen along the left jugular chain lymph   nodes involving is the left sternocleidomastoid muscle with mildly enlarged   left jugular chain and left submental lymph nodes. There is perhaps minimal   asymmetric skin thickening overlying the left sternocleidomastoid muscle. 2. There are multiple abnormal appearing right supraclavicular lymph nodes. 3. No discrete/drainable fluid collection is definitively identified of the   neck. 4. Soft tissue attenuation seen in the anterior mediastinum with enlarged   mediastinal lymph nodes. Please refer to the CT chest for further evaluation. ASSESSMENT:    Active Hospital Problems    Diagnosis Date Noted    Neck mass [R22.1] 07/05/2022     Priority: Medium         PLAN:    Neck mass  - CT soft tissue neck: Amorphous soft tissue attenuation seen along the left jugular chain lymph nodes involving the left sternocleidomastoid muscle with mildly enlarged left jugular chain and left submental lymph nodes. Multiple abnormal appearing right supraclavicular lymph nodes. No discrete drainable fluid collection. - CT chest: Abnormal soft tissue density seen in the mediastinum inseparable from the thymus gland raising a question of thymoma,  lymphoma would also be in the differential.  - monitor oxygen saturation and airway  - NPO after MN for potential procedure/biopsy  - consult hem/onc    DVT Prophylaxis: Lovenox  Diet: Diet NPO  Code Status: Full Code    Dispo - Inpatient       [address], APRN - CNP    Thank you No primary care provider on file. for the opportunity to be involved in this patient's care. If you have any questions or concerns please feel free to contact me at [phone].

## 2022-07-06 NOTE — PROGRESS NOTES
Hospitalist Progress Note  7/6/2022 9:41 AM    PCP: No primary care provider on file. [phone]     Date of Admission: 7/5/2022                                                                                                                     HOSPITAL COURSE    Patient demographics:  The patient  Ariana Coronado is a 29 y.o. male     Significant past medical history:   Patient Active Problem List   Diagnosis    Neck mass         Presenting symptoms:      Diagnostic workup:      CONSULTS DURING ADMISSION :   IP CONSULT TO HOSPITALIST  IP CONSULT TO ONCOLOGY  IP CONSULT TO SOCIAL WORK      Patient was diagnosed with:        Treatment while inpatient:                                                                                         ----------------------------------------------------------      SUBJECTIVE COMPLAINTS-     Diet: Diet NPO      OBJECTIVE:   Patient Active Problem List   Diagnosis    Neck mass       Allergies  Patient has no known allergies. Medications    Scheduled Meds:   sodium chloride flush  5-40 mL IntraVENous 2 times per day    enoxaparin  30 mg SubCUTAneous BID     Continuous Infusions:   sodium chloride       PRN Meds:  sodium chloride flush, sodium chloride, ondansetron **OR** ondansetron, polyethylene glycol, acetaminophen **OR** acetaminophen    Vitals   Vitals /wt   Patient Vitals for the past 8 hrs:   BP Temp Temp src Pulse Resp SpO2 Weight   07/06/22 0927 -- -- -- -- -- 97 % --   07/06/22 0421 -- -- -- -- -- -- 258 lb 9.6 oz (117.3 kg)   07/06/22 0347 109/74 98.1 °F (36.7 °C) Oral 93 18 98 % --        72HR INTAKE/OUTPUT:      Intake/Output Summary (Last 24 hours) at 7/6/2022 0941  Last data filed at 7/6/2022 0600  Gross per 24 hour   Intake 1600 ml   Output --   Net 1600 ml       Exam:    Gen:   Alert and oriented ×3   Eyes: PERRL. No sclera icterus. No conjunctival injection. ENT: No discharge. Pharynx clear.  External appearance of ears and nose normal.  Neck: collection. Hematology oncology consult is appreciated  Consulted chest surgery for biopsy    - CT chest:   Abnormal soft tissue density seen in the mediastinum inseparable from the thymus gland raising a question of thymoma,    lymphoma would also be in the differential.  - monitor oxygen saturation and airway          Code Status: Full Code        Dispo -cc        The patient and / or the family were informed of the results of any tests, a time was given to answer questions, a plan was proposed and they agreed with plan. Cuca Peres MD    This note was transcribed using [employer]. Please disregard any translational errors.

## 2022-07-06 NOTE — CONSULTS
[facility]    Consult Note      Requesting Physician: anai  CHIEF COMPLAINT: neck mass      HISTORY OF PRESENT ILLNESS:      Mr. Rahul Baires  is a 29 y.o. male we are seeing in consultation for hard swelling on the left neck and a rash  The pt is at home and cares for his father-currently not working and without health insurance who presents with a rapidly enlarging mass in  His neck  He denies stridor or trouble swallowing  No b symptoms   NO night sweats   No itching   He does have a rash that is scaling  He has no epistaxis or sore throat     Past Medical History:        Diagnosis Date    Depression     Mediastinal mass 07/05/2022    Obesity     Rash 07/05/2022    neck, scalp, anterior chest     Past Surgical History:    History reviewed. No pertinent surgical history. Current Medications:    Current Facility-Administered Medications: sodium chloride flush 0.9 % injection 5-40 mL, 5-40 mL, IntraVENous, 2 times per day  sodium chloride flush 0.9 % injection 5-40 mL, 5-40 mL, IntraVENous, PRN  0.9 % sodium chloride infusion, , IntraVENous, PRN  enoxaparin Sodium (LOVENOX) injection 30 mg, 30 mg, SubCUTAneous, BID  ondansetron (ZOFRAN-ODT) disintegrating tablet 4 mg, 4 mg, Oral, Q8H PRN **OR** ondansetron (ZOFRAN) injection 4 mg, 4 mg, IntraVENous, Q6H PRN  polyethylene glycol (GLYCOLAX) packet 17 g, 17 g, Oral, Daily PRN  acetaminophen (TYLENOL) tablet 650 mg, 650 mg, Oral, Q6H PRN **OR** acetaminophen (TYLENOL) suppository 650 mg, 650 mg, Rectal, Q6H PRN  Allergies:  Patient has no known allergies.     Social History:      Social History     Socioeconomic History    Marital status: Single     Spouse name: Not on file    Number of children: Not on file    Years of education: Not on file    Highest education level: Not on file   Occupational History    Not on file   Tobacco Use    Smoking status: Former Smoker     Packs/day: 1.00     Years: 1.00     Pack years: 1.00 Start date: 2007     Quit date: 2008     Years since quittin.5    Smokeless tobacco: Never Used    Tobacco comment: last use in    Substance and Sexual Activity    Alcohol use: Not Currently     Comment: last use 5 years ago    Drug use: Not Currently     Types: Marijuana (Weed)     Comment: last use 5 years ago    Sexual activity: Not on file   Other Topics Concern    Not on file   Social History Narrative    Not on file     Social Determinants of Health     Financial Resource Strain:     Difficulty of Paying Living Expenses: Not on file   Food Insecurity:     Worried About Running Out of Food in the Last Year: Not on file    June of Food in the Last Year: Not on file   Transportation Needs:     Lack of Transportation (Medical): Not on file    Lack of Transportation (Non-Medical):  Not on file   Physical Activity:     Days of Exercise per Week: Not on file    Minutes of Exercise per Session: Not on file   Stress:     Feeling of Stress : Not on file   Social Connections:     Frequency of Communication with Friends and Family: Not on file    Frequency of Social Gatherings with Friends and Family: Not on file    Attends Congregational Services: Not on file    Active Member of [address] [place] [address] [employer] or Organizations: Not on file    Attends Club or Organization Meetings: Not on file    Marital Status: Not on file   Intimate Partner Violence:     Fear of Current or Ex-Partner: Not on file    Emotionally Abused: Not on file    Physically Abused: Not on file    Sexually Abused: Not on file   Housing Stability:     Unable to Pay for Housing in the Last Year: Not on file    Number of [place] in the Last Year: Not on file    Unstable Housing in the Last Year: Not on file          Family History:         Problem Relation Age of Onset    Kidney Disease Father         on HD    Diabetes Father     Hypertension Father     Obesity Brother      REVIEW OF SYSTEMS:    ROS per the HPI   Otherwise  10 point ROS negative     PHYSICAL EXAM:      Vitals:  /79   Pulse 98   Temp 98.8 °F (37.1 °C) (Oral)   Resp 16   Ht 5' 5\" (1.651 m)   Wt 258 lb 9.6 oz (117.3 kg)   SpO2 97%   BMI 43.03 kg/m²     CONSTITUTIONAL:  awake, alert, cooperative, no apparent distress, and appears stated age NAD  EYES:  pupils equal, round and reactive to light, extra ocular muscles intact, sclera clear, conjunctiva normal  NECK:  Supple, symmetrical, trachea midline, no adenopathy, thyroid symmetric, not enlarged and no tenderness, skin normal  HEMATOLOGIC/LYMPHATICSabnormal left neck mass fixed and hard   No axilary adenopathy   LUNGS:  No increased work of breathing, good air exchange, clear to auscultation bilaterally, no crackles or wheezing  CARDIOVASCULAR:  , regular rate and rhythm, normal S1 and S2, no S3 or S4, and no murmur noted  ABDOMEN:  No scars, normal bowel sounds, soft, non-distended, non-tender, no masses palpated, no hepatosplenomegally      MUSCULOSKELETAL:  There is no redness, warmth, or swelling of the joints. Full range of motion noted. Motor strength is 5 out of 5 all extremities bilaterally. NEUROLOGIC:  Awake, alert, oriented to name, place and time. Cranial nerves II-XII are grossly intact. Motor is 5 out of 5 bilaterally. SKIN:  no bruising or bleeding      DATA:    PT/INR:    Recent Labs     07/05/22  1346 07/06/22  0624 07/06/22  1111   PROT 7.3 6.6  --    INR  --   --  1.21*     PTT:  No results for input(s): APTT in the last 72 hours.   CMP:    Lab Results   Component Value Date/Time     07/06/2022 06:24 AM    K 3.7 07/06/2022 06:24 AM    K 3.6 07/05/2022 01:46 PM     07/06/2022 06:24 AM    CO2 25 07/06/2022 06:24 AM    BUN 8 07/06/2022 06:24 AM    PROT 6.6 07/06/2022 06:24 AM     Magnesium:  No results found for: MG  Phosphorus:  No components found for: PO4  Calcium:  No results found for: CA  CBC:    Lab Results   Component Value Date/Time    WBC 8.6 07/06/2022 06:24 AM    RBC 4.15 07/06/2022 06:24 AM    HGB 8.9 07/06/2022 06:24 AM    HCT 32.2 07/06/2022 09:46 AM    MCV 67.9 07/06/2022 06:24 AM    RDW 18.0 07/06/2022 06:24 AM     07/06/2022 06:24 AM     DIFF:    Lab Results   Component Value Date/Time    MCV 67.9 07/06/2022 06:24 AM    RDW 18.0 07/06/2022 06:24 AM      LDH:  @labcrnt(LDH)@  Uric Acid:  @labcrnt(URIC)@    Radiology Review: CT SOFT TISSUE NECK W CONTRAST    Result Date: 7/5/2022  EXAMINATION: CT OF THE NECK SOFT TISSUE WITH CONTRAST  7/5/2022 TECHNIQUE: CT of the neck was performed with the administration of intravenous contrast. Multiplanar reformatted images are provided for review. Automated exposure control, iterative reconstruction, and/or weight based adjustment of the mA/kV was utilized to reduce the radiation dose to as low as reasonably achievable. COMPARISON: None. HISTORY: ORDERING SYSTEM PROVIDED HISTORY: Neck mass TECHNOLOGIST PROVIDED HISTORY: Reason for exam:->Neck mass Decision Support Exception - unselect if not a suspected or confirmed emergency medical condition->Emergency Medical Condition (MA) Reason for Exam: left sided lower neck mass for 3 weeks Initial evaluation. FINDINGS: PHARYNX/LARYNX:  The visualized upper aerodigestive tract appears grossly symmetric. The epiglottis appears normal. SALIVARY GLANDS/THYROID:  The parotid, submandibular and thyroid glands demonstrate no acute abnormality. LYMPH NODES:  There may be mildly enlarged left jugular chain as well as left submental lymph nodes. There are several abnormal appearing subcentimeter right supraclavicular lymph nodes (axial series 7 images 65 and 69). Additional enlarged abnormal appearing right supraclavicular lymph nodes noted (axial series 7, image 81). SOFT TISSUES:  There is amorphous soft tissue attenuation seen along is the left jugular chain lymph nodes, which appears to involve the left sternocleidomastoid muscle.   There is a focal skin thickening seen overlying the left sternocleidomastoid muscle. BRAIN/ORBITS/SINUSES:  Limited evaluation of the intracranial compartment demonstrates no acute abnormality. No abnormality seen of the orbits, paranasal sinuses and mastoid air cells. LUNG APICES/SUPERIOR MEDIASTINUM:  There is partially visualized soft tissue attenuation within the anterior mediastinum with suggestion of enlarged mediastinal lymph nodes. There is mild stranding of the subcutaneous fat along the anterior chest wall. Please refer to the CT chest for further evaluation. BONES:  No aggressive appearing lytic or blastic bony lesion. 1. Amorphous soft tissue attenuation seen along the left jugular chain lymph nodes involving is the left sternocleidomastoid muscle with mildly enlarged left jugular chain and left submental lymph nodes. There is perhaps minimal asymmetric skin thickening overlying the left sternocleidomastoid muscle. 2. There are multiple abnormal appearing right supraclavicular lymph nodes. 3. No discrete/drainable fluid collection is definitively identified of the neck. 4. Soft tissue attenuation seen in the anterior mediastinum with enlarged mediastinal lymph nodes. Please refer to the CT chest for further evaluation. CT CHEST W CONTRAST    Result Date: 7/5/2022  EXAMINATION: CT OF THE CHEST WITH CONTRAST 7/5/2022 2:24 pm TECHNIQUE: CT of the chest was performed with the administration of intravenous contrast. Multiplanar reformatted images are provided for review. Automated exposure control, iterative reconstruction, and/or weight based adjustment of the mA/kV was utilized to reduce the radiation dose to as low as reasonably achievable. COMPARISON: None.  HISTORY: ORDERING SYSTEM PROVIDED HISTORY: Neck mass TECHNOLOGIST PROVIDED HISTORY: Reason for exam:->Neck mass Reason for Exam: left sided lower neck mass for 3 weeks FINDINGS: Mediastinum: There is abnormal soft tissue density seen in the retrosternal clear space, and in the AP window, measuring 3.2 cm in thickness, anterior to posterior. .  Focal soft tissue nodule is seen anterior to the SVC measuring 3.4 cm by 3.6 cm. No obvious calcification internally. Small subcarinal nodes are also noted. No significant hilar adenopathy on the right or left. Lungs/pleura: Respiratory motion artifact limits evaluation of fine pulmonary parenchymal change. No obstructing endobronchial lesions are seen. De pendant opacity is seen at the lung bases, likely atelectasis Upper Abdomen: Limited images of adrenal glands are unremarkable. There is fatty infiltration of the liver. There is mild splenomegaly. Soft Tissues/Bones: Mild spurring is seen in the spineNo significant axillary adenopathy. Abnormal soft tissue density seen in the mediastinum, inseparable from the thymus gland, raising the question of thymoma. Lymphoma would also be in the differential.  Teratoma is considered less likely, given lack of calcification internally RECOMMENDATIONS: Unavailable     CT ABDOMEN PELVIS W IV CONTRAST Additional Contrast? Oral    Result Date: 7/6/2022  EXAMINATION: CT OF THE ABDOMEN AND PELVIS WITH CONTRAST, 7/6/2022 12:23 pm TECHNIQUE: CT of the abdomen and pelvis was performed with the administration of intravenous contrast. Multiplanar reformatted images are provided for review. Automated exposure control, iterative reconstruction, and/or weight based adjustment of the mA/kV was utilized to reduce the radiation dose to as low as reasonably achievable. COMPARISON: None HISTORY: ORDERING SYSTEM PROVIDED HISTORY:  Adenopathy TECHNOLOGIST PROVIDED HISTORY: Reason for Exam:  Adenopathy Additional Contrast?  Oral Reason for Exam:  Neck mass, lymphadenopathy FINDINGS: Lower Chest: No acute infiltrate at the lung bases. Organs: Mild hepatic steatosis with no focal abnormality. The spleen is mildly enlarged measuring 14.7 cm in AP dimension. The pancreas and adrenal glands are unremarkable.   No acute biliary findings. No renal mass or significant hydronephrosis. GI/Bowel: The stomach and duodenal sweep are unremarkable. No small bowel distension. The appendix is unremarkable. No pericolonic inflammatory changes with scattered stool throughout the colon. Pelvis: No pelvic mass or free pelvic fluid. No significant pelvic adenopathy. The prostate is not enlarged. Mild distention of the urinary bladder. Peritoneum/Retroperitoneum: The abdominal aorta is normal in caliber. No pathologic retroperitoneal adenopathy or upper abdominal ascites. Bones/Soft Tissues: No acute osseous or soft tissue abnormality. No significant inguinal adenopathy. 1. No acute findings within the abdomen or pelvis. No pathologic adenopathy is identified. 2. Mild splenomegaly. 3. Mild hepatic steatosis. Problem List  Patient Active Problem List   Diagnosis    Neck mass       IMPRESSION/RECOMMENDATIONS:    Mediastinal mass/neck adenopathy   Pt has abnormal neck adenopathy /mediastinal lesion   I have ordered an ir core biopsy/flow  Ct abd ordered  Pt needs help with insurance to apply consulted social work   His tx will  Be delayed without insurance so this is imperitive  I ordered germ cell tumor markers -  It could be hodgkins perhaps vs  nhl   thymomma--if he has this pure red cell aplasia is associated with this potentially connecting the anemia but this usually doesn't cause neck adenopathy   Doubt thyroid? Addendum, IR does not feel this can be biopsied in the neck?    The exam is certainly abnormal   Will consult ct surgery-if they do not think this is reachable then ill ask ent

## 2022-07-06 NOTE — PROGRESS NOTES
Omnipaque started at 1050 for CT of abdomen and pelvis. CT staff notified of start time. Pickup scheduled for 1200.     Electronically signed by Triny Napier RN on 7/6/22 at 11:18 AM EDT

## 2022-07-06 NOTE — PROGRESS NOTES
Received a message from ir they felt his neck was ?  Reactive -it does not feel reactive clinically   Ir suggested ct surg eval thus consult placed  IF they do not feel they can get a dx will ask ent

## 2022-07-06 NOTE — PLAN OF CARE
Problem: Discharge Planning  Goal: Discharge to home or other facility with appropriate resources  7/6/2022 1321 by Triny Napier RN  Outcome: Progressing  7/6/2022 0035 by Rose Mary Gillette RN  Outcome: Progressing  Flowsheets (Taken 7/5/2022 2010)  Discharge to home or other facility with appropriate resources:   Identify barriers to discharge with patient and caregiver   Arrange for needed discharge resources and transportation as appropriate   Identify discharge learning needs (meds, wound care, etc)     Problem: Pain  Goal: Verbalizes/displays adequate comfort level or baseline comfort level  7/6/2022 1321 by Triny Napier RN  Outcome: Progressing  7/6/2022 0035 by Rose Mary Gillette RN  Outcome: Progressing     Problem: ABCDS Injury Assessment  Goal: Absence of physical injury  7/6/2022 1321 by Triny Napier RN  Outcome: Progressing  7/6/2022 0035 by Rose Mary Gillette RN  Outcome: Progressing  Flowsheets (Taken 7/6/2022 0035)  Absence of Physical Injury: Implement safety measures based on patient assessment     Problem: Skin/Tissue Integrity - Adult  Goal: Skin integrity remains intact  7/6/2022 1321 by Triny Napier RN  Outcome: Progressing  7/6/2022 0035 by Rose Mary Gillette RN  Outcome: Progressing  Flowsheets  Taken 7/6/2022 0035  Skin Integrity Remains Intact:   Monitor for areas of redness and/or skin breakdown   Assess vascular access sites hourly  Taken 7/5/2022 2011  Skin Integrity Remains Intact:   Monitor for areas of redness and/or skin breakdown   Assess vascular access sites hourly

## 2022-07-06 NOTE — PROGRESS NOTES
Pt admitted to room 4125 from [facility]. Alert and oriented x4. Able to ambulate independently with a steady gait. Respirations easy and unlabored on room air. Vital sign stable. Rash visible to chest neck and scalp with mass to left neck. Pt denies nausea, vomiting, numbness, tingling, headache, dizziness, blurred vision, shortness of breath or chest pain. Oriented to room and call light. Given a snack per request. Perfect serve message sent to the on call Hospitalist to notify that pt has arrived. Awaiting orders. Will continue to monitor.

## 2022-07-06 NOTE — CARE COORDINATION
INITIAL CASE MANAGEMENT ASSESSMENT    Reviewed chart, met with patient to assess possible discharge needs. Explained Case Management role/services. Living Situation: Patient lives in an apartment with his Father and Cousin with one flight of steps to enter. ADLs: Independent     DME: None    PT/OT Recs: None     Active Services: None     Transportation: Active /Cousin will transport     Medications: [employer] Insurance/Pending Medicaid    PCP: No PCP      HD/PD: N/A    PLAN/COMMENTS: Patient plans to return to home with his family. He denies discharge needs. SW/CM provided contact information for patient or family to call with any questions. SW/CM will follow and assist as needed.   Electronically signed by Arti Umaña RN on 7/6/2022 at 3:26 PM

## 2022-07-07 LAB
AFP: 4.6 UG/L
HCG TUMOR MARKER: <1 IU/L (ref 0–3)
HEMATOLOGY PATH CONSULT: NORMAL

## 2022-07-07 PROCEDURE — 91305: CPT | Performed by: FAMILY MEDICINE

## 2022-07-07 PROCEDURE — 0051A: CPT | Performed by: FAMILY MEDICINE

## 2022-07-07 PROCEDURE — 2060000000 HC ICU INTERMEDIATE R&B

## 2022-07-07 PROCEDURE — 2580000003 HC RX 258: Performed by: NURSE PRACTITIONER

## 2022-07-07 PROCEDURE — 6360000002 HC RX W HCPCS: Performed by: FAMILY MEDICINE

## 2022-07-07 PROCEDURE — 6360000002 HC RX W HCPCS: Performed by: NURSE PRACTITIONER

## 2022-07-07 PROCEDURE — 91305 HC RX W HCPCS: CPT | Performed by: FAMILY MEDICINE

## 2022-07-07 PROCEDURE — 94760 N-INVAS EAR/PLS OXIMETRY 1: CPT

## 2022-07-07 PROCEDURE — 6370000000 HC RX 637 (ALT 250 FOR IP): Performed by: FAMILY MEDICINE

## 2022-07-07 PROCEDURE — 36415 COLL VENOUS BLD VENIPUNCTURE: CPT

## 2022-07-07 RX ORDER — CLOTRIMAZOLE AND BETAMETHASONE DIPROPIONATE 10; .64 MG/G; MG/G
CREAM TOPICAL 2 TIMES DAILY
Status: DISCONTINUED | OUTPATIENT
Start: 2022-07-07 | End: 2022-07-08 | Stop reason: HOSPADM

## 2022-07-07 RX ORDER — DIPHENHYDRAMINE HCL 25 MG
50 TABLET ORAL EVERY 6 HOURS PRN
Status: DISCONTINUED | OUTPATIENT
Start: 2022-07-07 | End: 2022-07-08 | Stop reason: HOSPADM

## 2022-07-07 RX ADMIN — CLOTRIMAZOLE AND BETAMETHASONE DIPROPIONATE: 10; .5 CREAM TOPICAL at 20:19

## 2022-07-07 RX ADMIN — BNT162B2 0.3 ML: 0.23 INJECTION, SUSPENSION INTRAMUSCULAR at 12:34

## 2022-07-07 RX ADMIN — CLOTRIMAZOLE AND BETAMETHASONE DIPROPIONATE: 10; .5 CREAM TOPICAL at 11:49

## 2022-07-07 RX ADMIN — SODIUM CHLORIDE, PRESERVATIVE FREE 10 ML: 5 INJECTION INTRAVENOUS at 08:26

## 2022-07-07 RX ADMIN — SODIUM CHLORIDE, PRESERVATIVE FREE 10 ML: 5 INJECTION INTRAVENOUS at 20:20

## 2022-07-07 RX ADMIN — ENOXAPARIN SODIUM 30 MG: 100 INJECTION SUBCUTANEOUS at 08:25

## 2022-07-07 ASSESSMENT — PAIN SCALES - GENERAL: PAINLEVEL_OUTOF10: 0

## 2022-07-07 NOTE — PROGRESS NOTES
Pt resting comfortably in chair. Call light in reach. Pt UAL. Denies needs at this time. Will continue to monitor.

## 2022-07-07 NOTE — PROGRESS NOTES
[facility]  Oncology Hematology Care  Progress Note      SUBJECTIVE:   Pt going for biopsy to [facility] after discussion with ir and ct surg  Labs only suggest a high sed rate   No obvious germ cell tumor lab markers     ROS: No fever chills sweats, no nausea, vomiting, diarrhea  shortness of breath chest pain or other pain  OBJECTIVE      Medications    Current Facility-Administered Medications: diphenhydrAMINE (BENADRYL) tablet 50 mg, 50 mg, Oral, Q6H PRN  clotrimazole-betamethasone (LOTRISONE) cream, , Topical, BID  sodium chloride flush 0.9 % injection 5-40 mL, 5-40 mL, IntraVENous, 2 times per day  sodium chloride flush 0.9 % injection 5-40 mL, 5-40 mL, IntraVENous, PRN  0.9 % sodium chloride infusion, , IntraVENous, PRN  [Held by provider] enoxaparin Sodium (LOVENOX) injection 30 mg, 30 mg, SubCUTAneous, BID  ondansetron (ZOFRAN-ODT) disintegrating tablet 4 mg, 4 mg, Oral, Q8H PRN **OR** ondansetron (ZOFRAN) injection 4 mg, 4 mg, IntraVENous, Q6H PRN  polyethylene glycol (GLYCOLAX) packet 17 g, 17 g, Oral, Daily PRN  acetaminophen (TYLENOL) tablet 650 mg, 650 mg, Oral, Q6H PRN **OR** acetaminophen (TYLENOL) suppository 650 mg, 650 mg, Rectal, Q6H PRN  Physical    VITALS:  /84   Pulse 99   Temp 99 °F (37.2 °C) (Oral)   Resp 18   Ht 5' 5\" (1.651 m)   Wt 261 lb 7.5 oz (118.6 kg)   SpO2 97%   BMI 43.51 kg/m²   TEMPERATURE:  Current - Temp: 99 °F (37.2 °C);  Max - Temp  Av.1 °F (37.3 °C)  Min: 98.8 °F (37.1 °C)  Max: 99.7 °F (37.6 °C)  PULSE OXIMETRY RANGE: SpO2  Av.8 %  Min: 96 %  Max: 97 %  24HR INTAKE/OUTPUT:    Intake/Output Summary (Last 24 hours) at 2022 1701  Last data filed at 2022 2257  Gross per 24 hour   Intake 480 ml   Output --   Net 480 ml       CONSTITUTIONAL:  awake, alert, cooperative, no apparent distress, HEENT oral pharynx , no scleral icterus  HEMATOLOGIC/LYMPHATICS: neck mass hard fixed no resp distress   LUNGS:  No increased work of breathing, good air exchange, clear to auscultation bilaterally, no crackles or wheezing  CARDIOVASCULAR:  , regular rate and rhythm, normal S1 and S2, no S3 or S4, and no murmur noted  ABDOMEN:  No scars, normal bowel sounds, soft, non-distended, non-tender, no masses palpated, no hepatosplenomegally  MUSCULOSKELETAL:  There is no redness, warmth, or swelling of the joints. EXTREMETIES: No clubbing cynosis or edema  NEUROLOGIC:  Awake, alert, oriented to name, place and time. Cranial nerves II-XII are grossly intact. Motor is 5 out of 5 bilaterally. SKIN:  no bruising or bleeding      Data      Recent Labs     07/05/22  1346 07/06/22  0624 07/06/22  0946   WBC 8.3 8.6  --    HGB 9.0* 8.9*  --    HCT 28.4* 28.1* 32.2*    325  --    MCV 66.9* 67.9*  --         Recent Labs     07/05/22  1346 07/06/22  0624    139   K 3.6 3.7    102   CO2 26 25   BUN 8 8   CREATININE 0.7* 0.7*     Recent Labs     07/05/22  1346 07/06/22  0624   AST 11* 11*   ALT 8* 6*   BILITOT <0.2 <0.2   ALKPHOS 62 60       Magnesium:  No results found for: MG    Imaging CT SOFT TISSUE NECK W CONTRAST    Result Date: 7/5/2022  EXAMINATION: CT OF THE NECK SOFT TISSUE WITH CONTRAST  7/5/2022 TECHNIQUE: CT of the neck was performed with the administration of intravenous contrast. Multiplanar reformatted images are provided for review. Automated exposure control, iterative reconstruction, and/or weight based adjustment of the mA/kV was utilized to reduce the radiation dose to as low as reasonably achievable. COMPARISON: None. HISTORY: ORDERING SYSTEM PROVIDED HISTORY: Neck mass TECHNOLOGIST PROVIDED HISTORY: Reason for exam:->Neck mass Decision Support Exception - unselect if not a suspected or confirmed emergency medical condition->Emergency Medical Condition (MA) Reason for Exam: left sided lower neck mass for 3 weeks Initial evaluation. FINDINGS: PHARYNX/LARYNX:  The visualized upper aerodigestive tract appears grossly symmetric.   The epiglottis appears normal. CONTRAST 7/5/2022 2:24 pm TECHNIQUE: CT of the chest was performed with the administration of intravenous contrast. Multiplanar reformatted images are provided for review. Automated exposure control, iterative reconstruction, and/or weight based adjustment of the mA/kV was utilized to reduce the radiation dose to as low as reasonably achievable. COMPARISON: None. HISTORY: ORDERING SYSTEM PROVIDED HISTORY: Neck mass TECHNOLOGIST PROVIDED HISTORY: Reason for exam:->Neck mass Reason for Exam: left sided lower neck mass for 3 weeks FINDINGS: Mediastinum: There is abnormal soft tissue density seen in the retrosternal clear space, and in the AP window, measuring 3.2 cm in thickness, anterior to posterior. .  Focal soft tissue nodule is seen anterior to the SVC measuring 3.4 cm by 3.6 cm. No obvious calcification internally. Small subcarinal nodes are also noted. No significant hilar adenopathy on the right or left. Lungs/pleura: Respiratory motion artifact limits evaluation of fine pulmonary parenchymal change. No obstructing endobronchial lesions are seen. De pendant opacity is seen at the lung bases, likely atelectasis Upper Abdomen: Limited images of adrenal glands are unremarkable. There is fatty infiltration of the liver. There is mild splenomegaly. Soft Tissues/Bones: Mild spurring is seen in the spineNo significant axillary adenopathy. Abnormal soft tissue density seen in the mediastinum, inseparable from the thymus gland, raising the question of thymoma.   Lymphoma would also be in the differential.  Teratoma is considered less likely, given lack of calcification internally RECOMMENDATIONS: Unavailable     CT ABDOMEN PELVIS W IV CONTRAST Additional Contrast? Oral    Result Date: 7/7/2022  EXAMINATION: CT OF THE ABDOMEN AND PELVIS WITH CONTRAST, 7/6/2022 12:23 pm TECHNIQUE: CT of the abdomen and pelvis was performed with the administration of intravenous contrast. Multiplanar reformatted images are provided for review. Automated exposure control, iterative reconstruction, and/or weight based adjustment of the mA/kV was utilized to reduce the radiation dose to as low as reasonably achievable. COMPARISON: None HISTORY: ORDERING SYSTEM PROVIDED HISTORY:  Adenopathy TECHNOLOGIST PROVIDED HISTORY: Reason for Exam:  Adenopathy Additional Contrast?  Oral Reason for Exam:  Neck mass, lymphadenopathy FINDINGS: Lower Chest: No acute infiltrate at the lung bases. Organs: Mild hepatic steatosis with no focal abnormality. The spleen is mildly enlarged measuring 14.7 cm in AP dimension. The pancreas and adrenal glands are unremarkable. No acute biliary findings. No renal mass or significant hydronephrosis. GI/Bowel: The stomach and duodenal sweep are unremarkable. No small bowel distension. The appendix is unremarkable. No pericolonic inflammatory changes with scattered stool throughout the colon. Pelvis: No pelvic mass or free pelvic fluid. No significant pelvic adenopathy. The prostate is not enlarged. Mild distention of the urinary bladder. Peritoneum/Retroperitoneum: The abdominal aorta is normal in caliber. No pathologic retroperitoneal adenopathy or upper abdominal ascites. Bones/Soft Tissues: No acute osseous or soft tissue abnormality. No significant inguinal adenopathy. 1. No acute findings within the abdomen or pelvis. No pathologic adenopathy is identified. 2. Mild splenomegaly. 3. Mild hepatic steatosis. Problem List  Patient Active Problem List   Diagnosis    Neck mass       ASSESSMENT AND PLAN    Mediastinal mass/neck mass  Ir biopsy   Esr high -? Hodgkins?    Can see itching with this   It does nto appear to be a germ cell based on hcg/afp being negative   abd pelvis neg for tumors   Once biopsy occurs can be dc  Bigger issue is if this is malignancy pt has no insurance  I have asked my office and social work to help get him insurance  We wont be able to treat without active insurance and if so we may have to [facility]  He does not have to stay inpt for test results he can go home after biopsy       Ada Ruiz MD, MD

## 2022-07-07 NOTE — PROGRESS NOTES
Shift assessment completed. VSS. Morning meds administered. Pt c/o discomfort from rash to upper back and chest. Will discuss w/hospitalist. Pt denies further needs at this time. Pt requested his food from home be put in fridge. Food labeled and placed in fridge in nutrition room.

## 2022-07-07 NOTE — PLAN OF CARE
Problem: Pain  Goal: Verbalizes/displays adequate comfort level or baseline comfort level  7/6/2022 2257 by Sharron Jones RN  Outcome: Progressing  7/6/2022 1321 by Troy Neal RN  Outcome: Progressing    Pain/discomfort being managed with PRN analgesics per MD orders. Pt able to express presence and absence of pain and rate pain appropriately using numerical scale. Non-pharmacologic comfort measures implemented. Rest and comfort promoted. Problem: ABCDS Injury Assessment  Goal: Absence of physical injury  7/6/2022 2257 by Sharron Jones RN  Outcome: Progressing  7/6/2022 1321 by Troy Neal RN  Outcome: Progressing     Problem: Safety - Adult  Goal: Free from fall injury  Outcome: Progressing    Patient uses call light appropriately to express needs. Bed to lowest position with door open and call light in reach. All fall precautions implemented at this time. Siderails up x2. Non skid footwear in place. Seen at intervals to ensure safety. All needs attended.        Problem: Skin/Tissue Integrity - Adult  Goal: Skin integrity remains intact  7/6/2022 2257 by Sharron Jones RN  Outcome: Progressing  7/6/2022 1321 by Troy Neal RN  Outcome: Progressing     Problem: Discharge Planning  Goal: Discharge to home or other facility with appropriate resources  7/6/2022 2257 by Sharron Jones RN  Outcome: Progressing  7/6/2022 1321 by Troy Neal RN  Outcome: Progressing

## 2022-07-07 NOTE — PLAN OF CARE
Problem: Pain  Goal: Verbalizes/displays adequate comfort level or baseline comfort level  7/7/2022 0842 by Jacobo Womack RN  Outcome: Progressing  7/6/2022 2257 by Lenin Brown RN  Outcome: Progressing     Problem: Skin/Tissue Integrity - Adult  Goal: Skin integrity remains intact  7/7/2022 0842 by Jacobo Womack RN  Outcome: Progressing  7/6/2022 2257 by Lenin Brown RN  Outcome: Progressing     Problem: Safety - Adult  Goal: Free from fall injury  7/7/2022 0842 by Jacobo Womack RN  Outcome: Progressing  7/6/2022 2257 by Lenin Brown RN  Outcome: Progressing

## 2022-07-07 NOTE — PROGRESS NOTES
3.6 3.7    102   CO2 26 25   BUN 8 8   CREATININE 0.7* 0.7*     Mag: No results for input(s): MAG in the last 72 hours. Phos: No results found for: PHOS  No results found for: GLU    LIVER PROFILE:   Recent Labs     07/05/22  1346 07/06/22  0624   AST 11* 11*   ALT 8* 6*   BILITOT <0.2 <0.2   ALKPHOS 62 60     PT/INR:   Recent Labs     07/06/22  1111   PROTIME 15.3*   INR 1.21*     APTT: No results for input(s): APTT in the last 72 hours. UA:  Recent Labs     07/05/22  1525   COLORU Yellow   PHUR 6.0   CLARITYU Clear   SPECGRAV 1.010   LEUKOCYTESUR Negative   UROBILINOGEN 0.2   BILIRUBINUR Negative   BLOODU Negative   GLUCOSEU Negative       Invalid input(s): ABG  Lab Results   Component Value Date    CALCIUM 8.7 07/06/2022       Assessment:    Principal Problem:    Neck mass  Resolved Problems:    * No resolved hospital problems. [facility] course: 29 y.o. male with PMHx of depression presented to [facility] in transfer from [facility] ED for evaluation of left neck mass and chest mass. he also has a rash to the upper chest, neck and in the scalp which he noted about 2 weeks ago. the mass to the left side of his neck which has been present about 3 to 4 weeks. He denies pain to this area. No redness or warmth. No drainage. Patient has no chest pain or shortness of breath. No fever, chills or body aches. No recent weight loss. Oncology has been consulted    Plan:  Neck mass  - CT soft tissue neck: Amorphous soft tissue attenuation seen along the left jugular chain lymph nodes involving the left sternocleidomastoid muscle with mildly enlarged left jugular chain and left submental lymph nodes. Multiple abnormal appearing right supraclavicular lymph nodes. No discrete drainable fluid collection.   - CT chest: Abnormal soft tissue density seen in the mediastinum inseparable from the thymus gland raising a question of thymoma,  lymphoma would also be in the differential.  - monitor oxygen saturation and airway  - NPO after MN for potential procedure/biopsy  - consulted ct surgery for mediastinal biopsy  - consulted hem/onc  - will try Lotrisone for his rash     Code status:  full  DVT prophylaxis: [x] Lovenox  [] SQ Heparin  [] SCDs because of  [] warfarin/oral direct thrombin inhibitor [] Encourage ambulation      Disposition:  [] Home [] Rehab [] Psych [] SNF  [] LTAC  [] Transfer to ICU  [] Transfer to PCU [] Other: in pt    Electronically signed by Katrin Alvarenga DO on 7/7/2022 at 4:09 PM

## 2022-07-08 ENCOUNTER — APPOINTMENT (OUTPATIENT)
Dept: CT IMAGING | Age: 37
DRG: 385 | End: 2022-07-08
Payer: MEDICAID

## 2022-07-08 ENCOUNTER — APPOINTMENT (OUTPATIENT)
Dept: GENERAL RADIOLOGY | Age: 37
DRG: 385 | End: 2022-07-08
Payer: MEDICAID

## 2022-07-08 VITALS
HEIGHT: 65 IN | TEMPERATURE: 99.1 F | OXYGEN SATURATION: 99 % | WEIGHT: 261.47 LBS | HEART RATE: 98 BPM | RESPIRATION RATE: 16 BRPM | SYSTOLIC BLOOD PRESSURE: 118 MMHG | DIASTOLIC BLOOD PRESSURE: 73 MMHG | BODY MASS INDEX: 43.56 KG/M2

## 2022-07-08 PROCEDURE — 88341 IMHCHEM/IMCYTCHM EA ADD ANTB: CPT

## 2022-07-08 PROCEDURE — 99232 SBSQ HOSP IP/OBS MODERATE 35: CPT | Performed by: THORACIC SURGERY (CARDIOTHORACIC VASCULAR SURGERY)

## 2022-07-08 PROCEDURE — 2580000003 HC RX 258: Performed by: NURSE PRACTITIONER

## 2022-07-08 PROCEDURE — 88185 FLOWCYTOMETRY/TC ADD-ON: CPT

## 2022-07-08 PROCEDURE — 0WBC3ZX EXCISION OF MEDIASTINUM, PERCUTANEOUS APPROACH, DIAGNOSTIC: ICD-10-PCS | Performed by: RADIOLOGY

## 2022-07-08 PROCEDURE — 38505 NEEDLE BIOPSY LYMPH NODES: CPT | Performed by: INTERNAL MEDICINE

## 2022-07-08 PROCEDURE — 88333 PATH CONSLTJ SURG CYTO XM 1: CPT

## 2022-07-08 PROCEDURE — 88307 TISSUE EXAM BY PATHOLOGIST: CPT

## 2022-07-08 PROCEDURE — 88342 IMHCHEM/IMCYTCHM 1ST ANTB: CPT

## 2022-07-08 PROCEDURE — 94760 N-INVAS EAR/PLS OXIMETRY 1: CPT

## 2022-07-08 PROCEDURE — 88184 FLOWCYTOMETRY/ TC 1 MARKER: CPT

## 2022-07-08 PROCEDURE — 71045 X-RAY EXAM CHEST 1 VIEW: CPT

## 2022-07-08 PROCEDURE — 99153 MOD SED SAME PHYS/QHP EA: CPT

## 2022-07-08 PROCEDURE — 6360000002 HC RX W HCPCS: Performed by: RADIOLOGY

## 2022-07-08 PROCEDURE — 77012 CT SCAN FOR NEEDLE BIOPSY: CPT

## 2022-07-08 PROCEDURE — 88334 PATH CONSLTJ SURG CYTO XM EA: CPT

## 2022-07-08 RX ORDER — MIDAZOLAM HYDROCHLORIDE 1 MG/ML
INJECTION INTRAMUSCULAR; INTRAVENOUS DAILY PRN
Status: COMPLETED | OUTPATIENT
Start: 2022-07-08 | End: 2022-07-08

## 2022-07-08 RX ORDER — CLOTRIMAZOLE AND BETAMETHASONE DIPROPIONATE 10; .64 MG/G; MG/G
CREAM TOPICAL
Qty: 45 G | Refills: 1 | Status: SHIPPED | OUTPATIENT
Start: 2022-07-08 | End: 2022-07-15

## 2022-07-08 RX ORDER — FENTANYL CITRATE 50 UG/ML
INJECTION, SOLUTION INTRAMUSCULAR; INTRAVENOUS DAILY PRN
Status: COMPLETED | OUTPATIENT
Start: 2022-07-08 | End: 2022-07-08

## 2022-07-08 RX ORDER — CLOTRIMAZOLE AND BETAMETHASONE DIPROPIONATE 10; .64 MG/G; MG/G
CREAM TOPICAL
Qty: 45 G | Refills: 1 | Status: SHIPPED | OUTPATIENT
Start: 2022-07-08 | End: 2022-07-08

## 2022-07-08 RX ADMIN — MIDAZOLAM 1 MG: 1 INJECTION INTRAMUSCULAR; INTRAVENOUS at 10:18

## 2022-07-08 RX ADMIN — FENTANYL CITRATE 50 MCG: 50 INJECTION INTRAMUSCULAR; INTRAVENOUS at 10:23

## 2022-07-08 RX ADMIN — CLOTRIMAZOLE AND BETAMETHASONE DIPROPIONATE: 10; .5 CREAM TOPICAL at 10:04

## 2022-07-08 RX ADMIN — FENTANYL CITRATE 50 MCG: 50 INJECTION INTRAMUSCULAR; INTRAVENOUS at 10:18

## 2022-07-08 RX ADMIN — MIDAZOLAM 1 MG: 1 INJECTION INTRAMUSCULAR; INTRAVENOUS at 10:29

## 2022-07-08 RX ADMIN — SODIUM CHLORIDE, PRESERVATIVE FREE 10 ML: 5 INJECTION INTRAVENOUS at 10:05

## 2022-07-08 NOTE — PLAN OF CARE
Problem: Pain  Goal: Verbalizes/displays adequate comfort level or baseline comfort level  Outcome: Progressing     Pain/discomfort being managed with PRN analgesics per MD orders. Pt able to express presence and absence of pain and rate pain appropriately using numerical scale. Non-pharmacologic comfort measures implemented. Rest and comfort promoted. Problem: ABCDS Injury Assessment  Goal: Absence of physical injury  Outcome: Progressing     Problem: Safety - Adult  Goal: Free from fall injury  Outcome: Progressing    Patient uses call light appropriately to express needs. Bed to lowest position with door open and call light in reach. All fall precautions implemented at this time. Siderails up x2. Non skid footwear in place. Seen at intervals to ensure safety. All needs attended.       Problem: Skin/Tissue Integrity - Adult  Goal: Skin integrity remains intact  Outcome: Progressing     Problem: Discharge Planning  Goal: Discharge to home or other facility with appropriate resources  Outcome: Progressing Worsening disease in the brain. Has had several craniotomies  - Rad/Onc follow up for continuing radiation Patient seen by oncologist at [facility] - Dr. Byrd. Patient awaiting to speak to her oncologist. Overall poor prognosis, pending discussion with oncologist.

## 2022-07-08 NOTE — PRE SEDATION
Sedation Pre-Procedure Note    Patient Name: Kerrie Norris   YOB: 1985  Room/Bed: H5R-3644/5412-90  Medical Record Number: [id number]  Date: 7/8/2022   Time: 10:12 AM       Indication:  Mediastinal mass    Consent: I have discussed with the patient and/or the patient representative the indication, alternatives, and the possible risks and/or complications of the planned procedure and the anesthesia methods. The patient and/or patient representative appear to understand and agree to proceed. Vital Signs:   Vitals:    07/08/22 0807   BP: 115/75   Pulse: (!) 101   Resp: 17   Temp: 98.4 °F (36.9 °C)   SpO2: 97%       Past Medical History:   has a past medical history of Depression, Mediastinal mass, Obesity, and Rash. Past Surgical History:   has no past surgical history on file. Medications:   Scheduled Meds:    clotrimazole-betamethasone   Topical BID    sodium chloride flush  5-40 mL IntraVENous 2 times per day    [Held by provider] enoxaparin  30 mg SubCUTAneous BID     Continuous Infusions:    sodium chloride       PRN Meds: diphenhydrAMINE, sodium chloride flush, sodium chloride, ondansetron **OR** ondansetron, polyethylene glycol, acetaminophen **OR** acetaminophen  Home Meds:   Prior to Admission medications    Not on File     Coumadin Use Last 7 Days:  no  Antiplatelet drug therapy use last 7 days: no  Other anticoagulant use last 7 days: no  Additional Medication Information:        Pre-Sedation Documentation and Exam:   I have reviewed the patient's history and review of systems.     Mallampati Airway Assessment:  normal neck range of motion    Prior History of Anesthesia Complications:   none    ASA Classification:  Class 2 - A normal healthy patient with mild systemic disease    Sedation/ Anesthesia Plan:   intravenous sedation    Medications Planned:   midazolam (Versed) intravenously and fentanyl intravenously    Patient is an appropriate candidate for plan of sedation: yes    Electronically signed by Samantha Preston MD on 7/8/2022 at 10:12 AM

## 2022-07-08 NOTE — PROGRESS NOTES
Written and verbal discharge instructions given. IV saline lock removed. Waiting on cousin to pick him up for discharge.

## 2022-07-08 NOTE — DISCHARGE INSTR - DIET

## 2022-07-08 NOTE — PROGRESS NOTES
[facility]  Oncology Hematology Care  Progress Note      SUBJECTIVE:   Pt awaiting bx  No airway issues   abd ct neg  ROS: No fever chills sweats, no nausea, vomiting, diarrhea  shortness of breath chest pain or other pain  OBJECTIVE      Medications    Current Facility-Administered Medications: diphenhydrAMINE (BENADRYL) tablet 50 mg, 50 mg, Oral, Q6H PRN  clotrimazole-betamethasone (LOTRISONE) cream, , Topical, BID  sodium chloride flush 0.9 % injection 5-40 mL, 5-40 mL, IntraVENous, 2 times per day  sodium chloride flush 0.9 % injection 5-40 mL, 5-40 mL, IntraVENous, PRN  0.9 % sodium chloride infusion, , IntraVENous, PRN  [Held by provider] enoxaparin Sodium (LOVENOX) injection 30 mg, 30 mg, SubCUTAneous, BID  ondansetron (ZOFRAN-ODT) disintegrating tablet 4 mg, 4 mg, Oral, Q8H PRN **OR** ondansetron (ZOFRAN) injection 4 mg, 4 mg, IntraVENous, Q6H PRN  polyethylene glycol (GLYCOLAX) packet 17 g, 17 g, Oral, Daily PRN  acetaminophen (TYLENOL) tablet 650 mg, 650 mg, Oral, Q6H PRN **OR** acetaminophen (TYLENOL) suppository 650 mg, 650 mg, Rectal, Q6H PRN  Physical    VITALS:  /75   Pulse (!) 101   Temp 98.4 °F (36.9 °C) (Oral)   Resp 17   Ht 5' 5\" (1.651 m)   Wt 261 lb 7.5 oz (118.6 kg)   SpO2 97%   BMI 43.51 kg/m²   TEMPERATURE:  Current - Temp: 98.4 °F (36.9 °C);  Max - Temp  Av.5 °F (36.9 °C)  Min: 98.2 °F (36.8 °C)  Max: 99 °F (37.2 °C)  PULSE OXIMETRY RANGE: SpO2  Av %  Min: 97 %  Max: 97 %  24HR INTAKE/OUTPUT:    Intake/Output Summary (Last 24 hours) at 2022 8362  Last data filed at 2022 2318  Gross per 24 hour   Intake 240 ml   Output --   Net 240 ml       CONSTITUTIONAL:  awake, alert, cooperative, no apparent distress, HEENT oral pharynx , no scleral icterus  HEMATOLOGIC/LYMPHATICS:+ neck mass hard fixed   LUNGS:  No increased work of breathing, good air exchange, clear to auscultation bilaterally, no crackles or wheezing  CARDIOVASCULAR:  , regular rate and rhythm, normal S1 and S2, no S3 or S4, and no murmur noted  ABDOMEN:  No scars, normal bowel sounds, soft, non-distended, non-tender, no masses palpated, no hepatosplenomegally  MUSCULOSKELETAL:  There is no redness, warmth, or swelling of the joints. EXTREMETIES: No clubbing cynosis or edema  NEUROLOGIC:  Awake, alert, oriented to name, place and time. Cranial nerves II-XII are grossly intact. Data      Recent Labs     07/05/22 1346 07/06/22  0624 07/06/22  0946   WBC 8.3 8.6  --    HGB 9.0* 8.9*  --    HCT 28.4* 28.1* 32.2*    325  --    MCV 66.9* 67.9*  --         Recent Labs     07/05/22 1346 07/06/22  0624    139   K 3.6 3.7    102   CO2 26 25   BUN 8 8   CREATININE 0.7* 0.7*     Recent Labs     07/05/22 1346 07/06/22 0624   AST 11* 11*   ALT 8* 6*   BILITOT <0.2 <0.2   ALKPHOS 62 60       Magnesium:  No results found for: MG    Imaging CT SOFT TISSUE NECK W CONTRAST    Result Date: 7/5/2022  EXAMINATION: CT OF THE NECK SOFT TISSUE WITH CONTRAST  7/5/2022 TECHNIQUE: CT of the neck was performed with the administration of intravenous contrast. Multiplanar reformatted images are provided for review. Automated exposure control, iterative reconstruction, and/or weight based adjustment of the mA/kV was utilized to reduce the radiation dose to as low as reasonably achievable. COMPARISON: None. HISTORY: ORDERING SYSTEM PROVIDED HISTORY: Neck mass TECHNOLOGIST PROVIDED HISTORY: Reason for exam:->Neck mass Decision Support Exception - unselect if not a suspected or confirmed emergency medical condition->Emergency Medical Condition (MA) Reason for Exam: left sided lower neck mass for 3 weeks Initial evaluation. FINDINGS: PHARYNX/LARYNX:  The visualized upper aerodigestive tract appears grossly symmetric. The epiglottis appears normal. SALIVARY GLANDS/THYROID:  The parotid, submandibular and thyroid glands demonstrate no acute abnormality.  LYMPH NODES:  There may be mildly enlarged left jugular chain as well as left submental lymph nodes. There are several abnormal appearing subcentimeter right supraclavicular lymph nodes (axial series 7 images 65 and 69). Additional enlarged abnormal appearing right supraclavicular lymph nodes noted (axial series 7, image 81). SOFT TISSUES:  There is amorphous soft tissue attenuation seen along is the left jugular chain lymph nodes, which appears to involve the left sternocleidomastoid muscle. There is a focal skin thickening seen overlying the left sternocleidomastoid muscle. BRAIN/ORBITS/SINUSES:  Limited evaluation of the intracranial compartment demonstrates no acute abnormality. No abnormality seen of the orbits, paranasal sinuses and mastoid air cells. LUNG APICES/SUPERIOR MEDIASTINUM:  There is partially visualized soft tissue attenuation within the anterior mediastinum with suggestion of enlarged mediastinal lymph nodes. There is mild stranding of the subcutaneous fat along the anterior chest wall. Please refer to the CT chest for further evaluation. BONES:  No aggressive appearing lytic or blastic bony lesion. 1. Amorphous soft tissue attenuation seen along the left jugular chain lymph nodes involving is the left sternocleidomastoid muscle with mildly enlarged left jugular chain and left submental lymph nodes. There is perhaps minimal asymmetric skin thickening overlying the left sternocleidomastoid muscle. 2. There are multiple abnormal appearing right supraclavicular lymph nodes. 3. No discrete/drainable fluid collection is definitively identified of the neck. 4. Soft tissue attenuation seen in the anterior mediastinum with enlarged mediastinal lymph nodes. Please refer to the CT chest for further evaluation. CT CHEST W CONTRAST    Result Date: 7/5/2022  EXAMINATION: CT OF THE CHEST WITH CONTRAST 7/5/2022 2:24 pm TECHNIQUE: CT of the chest was performed with the administration of intravenous contrast. Multiplanar reformatted images are provided for review. Automated exposure control, iterative reconstruction, and/or weight based adjustment of the mA/kV was utilized to reduce the radiation dose to as low as reasonably achievable. COMPARISON: None. HISTORY: ORDERING SYSTEM PROVIDED HISTORY: Neck mass TECHNOLOGIST PROVIDED HISTORY: Reason for exam:->Neck mass Reason for Exam: left sided lower neck mass for 3 weeks FINDINGS: Mediastinum: There is abnormal soft tissue density seen in the retrosternal clear space, and in the AP window, measuring 3.2 cm in thickness, anterior to posterior. .  Focal soft tissue nodule is seen anterior to the SVC measuring 3.4 cm by 3.6 cm. No obvious calcification internally. Small subcarinal nodes are also noted. No significant hilar adenopathy on the right or left. Lungs/pleura: Respiratory motion artifact limits evaluation of fine pulmonary parenchymal change. No obstructing endobronchial lesions are seen. De pendant opacity is seen at the lung bases, likely atelectasis Upper Abdomen: Limited images of adrenal glands are unremarkable. There is fatty infiltration of the liver. There is mild splenomegaly. Soft Tissues/Bones: Mild spurring is seen in the spineNo significant axillary adenopathy. Abnormal soft tissue density seen in the mediastinum, inseparable from the thymus gland, raising the question of thymoma. Lymphoma would also be in the differential.  Teratoma is considered less likely, given lack of calcification internally RECOMMENDATIONS: Unavailable     CT ABDOMEN PELVIS W IV CONTRAST Additional Contrast? Oral    Result Date: 7/7/2022  EXAMINATION: CT OF THE ABDOMEN AND PELVIS WITH CONTRAST, 7/6/2022 12:23 pm TECHNIQUE: CT of the abdomen and pelvis was performed with the administration of intravenous contrast. Multiplanar reformatted images are provided for review.  Automated exposure control, iterative reconstruction, and/or weight based adjustment of the mA/kV was utilized to reduce the radiation dose to as low as

## 2022-07-08 NOTE — PLAN OF CARE
Problem: Discharge Planning  Goal: Discharge to home or other facility with appropriate resources  4/2/7560 3849 by Kim Schwartz RN  Outcome: Progressing  7/7/2022 2319 by Eun Burnett RN  Outcome: Progressing     Problem: Pain  Goal: Verbalizes/displays adequate comfort level or baseline comfort level  9/6/8023 7453 by Kim Schwartz RN  Outcome: Progressing  7/7/2022 2319 by Eun Burnett RN  Outcome: Progressing     Problem: ABCDS Injury Assessment  Goal: Absence of physical injury  4/6/4639 1217 by Kim Schwartz RN  Outcome: Progressing  7/7/2022 2319 by Eun Burnett RN  Outcome: Progressing     Problem: Skin/Tissue Integrity - Adult  Goal: Skin integrity remains intact  7/4/9899 5052 by Kim Schwartz RN  Outcome: Progressing  7/7/2022 2319 by Eun Burnett RN  Outcome: Progressing     Problem: Safety - Adult  Goal: Free from fall injury  6/8/2766 9012 by Kim Schwartz RN  Outcome: Progressing  7/7/2022 2319 by Eun Burnett RN  Outcome: Progressing

## 2022-07-08 NOTE — CARE COORDINATION
Received call from Ankit at [facility] ([phone]). She reported that [facility] will not be able to follow patient for treatment due to insurance issue. Recommendation is patient follow up at [facility]. Left message for the 4W  to follow up on above.      Electronically signed by AUBREY Mathur, GRACE, Case Management on 7/8/2022 at 11:27 AM  [facility] [phone]

## 2022-07-08 NOTE — DISCHARGE INSTR - COC
Continuity of Care Form    Patient Name: Bernie Zhao   :  1985  MRN:  [id number]    Admit date:  2022  Discharge date:  ***    Code Status Order: Full Code   Advance Directives:      Admitting Physician:  Sweetie Quiros DO  PCP: No primary care provider on file.     Discharging Nurse: [facility] Unit/Room#: [id number]-1508/2305-47  Discharging Unit Phone Number: ***    Emergency Contact:   Extended Emergency Contact Information  Primary Emergency Contact: [place] Phone: [phone]  Relation: Other Relative    Past Surgical History:  Past Surgical History:   Procedure Laterality Date    CT BIOPSY LYMPH NODES SUPERFICIAL  2022    CT BIOPSY LYMPH NODES SUPERFICIAL 2022 [facility] CT       Immunization History:   Immunization History   Administered Date(s) Administered    COVID-19, PFIZER GRAY top, DO NOT Dilute, (age 15 y+), IM, 30 mcg/0.3 mL 2022       Active Problems:  Patient Active Problem List   Diagnosis Code    Neck mass R22.1       Isolation/Infection:   Isolation            No Isolation          Patient Infection Status       None to display            Nurse Assessment:  Last Vital Signs: /73   Pulse 98   Temp 99.1 °F (37.3 °C) (Oral)   Resp 16   Ht 5' 5\" (1.651 m)   Wt 261 lb 7.5 oz (118.6 kg)   SpO2 99%   BMI 43.51 kg/m²     Last documented pain score (0-10 scale): Pain Level: 0  Last Weight:   Wt Readings from Last 1 Encounters:   22 261 lb 7.5 oz (118.6 kg)     Mental Status:  {IP PT MENTAL STATUS:}    IV Access:  { TOBI IV ACCESS:083525846}    Nursing Mobility/ADLs:  Walking   {CHP DME BTHY:952401542}  Transfer  {CHP DME ELID:390283413}  Bathing  {CHP DME MXQP:936508147}  Dressing  {CHP DME DGJV:487197577}  Toileting  {CHP DME UUSY:255842399}  Feeding  {CHP DME LINI:105936298}  Med Admin  {CHP DME FNGM:036850947}  Med Delivery   { TOBI MED Delivery:722548188}    Wound Care Documentation and Therapy:        Elimination:  Continence: Bowel: {YES / GB:27998}  Bladder: {YES / MO:37037}  Urinary Catheter: {Urinary Catheter:246609129}   Colostomy/Ileostomy/Ileal Conduit: {YES / UA:76736}       Date of Last BM: ***    Intake/Output Summary (Last 24 hours) at 2022 1539  Last data filed at 2022 2318  Gross per 24 hour   Intake 240 ml   Output --   Net 240 ml     I/O last 3 completed shifts:   In: 5 [P.O.:720]  Out: -     Safety Concerns:     [address] Concerns:965674613}    Impairments/Disabilities:      [address] Impairments/Disabilities:668601308}    Nutrition Therapy:  Current Nutrition Therapy:   [address] TOBI Diet List:192073685}    Routes of Feeding: {CHP DME Other Feedings:157705898}  Liquids: {Slp liquid thickness:00324}  Daily Fluid Restriction: {CHP DME Yes amt example:184243777}  Last Modified Barium Swallow with Video (Video Swallowing Test): {Done Not Done EGPU:035162699}    Treatments at the Time of Hospital Discharge:   Respiratory Treatments: ***  Oxygen Therapy:  {Therapy; copd oxygen:27537}  Ventilator:    { CC Vent NDKM:464956976}    Rehab Therapies: {THERAPEUTIC INTERVENTION:5551607398}  Weight Bearing Status/Restrictions: [address] CC Weight Bearin}  Other Medical Equipment (for information only, NOT a DME order):  {EQUIPMENT:947664768}  Other Treatments: ***    Patient's personal belongings (please select all that are sent with patient):  {CHP DME Belongings:022150173}    RN SIGNATURE:  {Esignature:817228744}    CASE MANAGEMENT/SOCIAL WORK SECTION    Inpatient Status Date: ***    Readmission Risk Assessment Score:  Readmission Risk              Risk of Unplanned Readmission:  10           Discharging to Facility/ Agency   Name:   Address:  Phone:  Fax:    Dialysis Facility (if applicable)   Name:  Address:  Dialysis Schedule:  Phone:  Fax:    / signature: {Esignature:629739827}    PHYSICIAN SECTION    Prognosis: {Prognosis:2529234896}    Condition at Discharge: [address] Patient Condition:587230514}    Rehab Potential (if transferring to Rehab): {Prognosis:3786987799}    Recommended Labs or Other Treatments After Discharge: ***    Physician Certification: I certify the above information and transfer of Tello Mathews  is necessary for the continuing treatment of the diagnosis listed and that he requires {Admit to Appropriate Level of Care:27881} for {GREATER/LESS:414367356} 30 days.      Update Admission H&P: {CHP DME Changes in IQATX:026269092}    PHYSICIAN SIGNATURE:  {Esignature:948816212}

## 2022-07-08 NOTE — PROGRESS NOTES
Progress Note    Mediastinal mass    Vital Signs:                                                 /87   Pulse (!) 101   Temp 98.2 °F (36.8 °C) (Oral)   Resp 20   Ht 5' 5\" (1.651 m)   Wt 261 lb 7.5 oz (118.6 kg)   SpO2 97%   BMI 43.51 kg/m²           Admission Weight: 261 lb 3.9 oz (118.5 kg)           I/O:    Intake/Output Summary (Last 24 hours) at 7/8/2022 0805  Last data filed at 7/7/2022 2318  Gross per 24 hour   Intake 240 ml   Output --   Net 240 ml     General: Awake, alert and oriented  CV: regular, rash noted anterior chest, back of the neck. Pulm: clear  Abd: soft, + BS  Ext: warm and dry, 3 cm by 1 cm nodule superior to left clavicle. Neuro: No focal deficits, moves all extremities with equal strength bilat    Data Review:  CBC:   Recent Labs     07/05/22  1346 07/06/22  0624 07/06/22  0946   WBC 8.3 8.6  --    HGB 9.0* 8.9*  --    HCT 28.4* 28.1* 32.2*   MCV 66.9* 67.9*  --     325  --      BMP:   Recent Labs     07/05/22  1346 07/06/22  0624    139   K 3.6 3.7    102   CO2 26 25   BUN 8 8   CREATININE 0.7* 0.7*   CALCIUM 9.4 8.7     Cardiac Enzymes: No results for input(s): CKTOTAL, CKMB, CKMBINDEX, TROPONINI in the last 72 hours. PT/INR:   Recent Labs     07/06/22  1111   PROTIME 15.3*   INR 1.21*     APTT: No results for input(s): APTT in the last 72 hours. Assessment/Plan:  Lovenox on hold  NPO since midnight  CT guided mediastinal mass biopsy per IR today    KIAN Davis - CNP  7/8/2022  8:05 AM   Note reviewed, events of last 24 hours reviewed along with vital signs and I/Os and patient examined. Assessment and plans discussed and are as outlined above.      Raven Harper MD, FACS, [address], FACCP, [place]

## 2022-07-08 NOTE — CARE COORDINATION
7/8 Call placed to the [facility] [phone]. Appointment made for Dr. Indio Gonzales, [address] [place] [address], [phone], July 29,2022 at [address]. Facesheet/referral faxed to [phone].  Please arrive at 9:05 AM. Electronically signed by Mike Rao RN on 7/8/2022 at 3:31 PM

## 2022-07-08 NOTE — BRIEF OP NOTE
Brief Postoperative Note    Александр Mccall  YOB: 1985  [id number]    Pre-operative Diagnosis:  Mediastinal Mass    Post-operative Diagnosis: Same    Procedure: CT Guided Biopsy of Mediastinal mass    Anesthesia: Local and Moderate Sedation    Surgeons/Assistants: Christal Garcia MD    Estimated Blood Loss: less than 5mL    Complications: None    Specimens: Was Obtained: Tissue from target. Findings: Technically successful biopsy of mediastinal mass. Tissue sent to pathology.  3, 20G cores    Electronically signed by Christal Garcia MD on 7/8/2022 at 10:43 AM

## 2022-07-08 NOTE — PROGRESS NOTES
CLINICAL PHARMACY NOTE: MEDS TO BEDS     Medication Assistance Voucher  Patient: Belkis Martinez  YOB: 1985    Floor/Unit:[id number]  Discharge Date: 7/8/2022   Approver Name/Title:    Cost Center to be cross-charged: [id number]  Medications Approved : CLINICAL PHARMACY NOTE: MEDS TO BEDS    Total # of Prescriptions Filled: 1   The following medications were delivered to the patient:  Current Discharge Medication List      START taking these medications    Details   clotrimazole-betamethasone (LOTRISONE) 1-0.05 % cream Apply topically 2 times daily. Qty: 45 g, Refills: 1         ·       Additional Documentation:      By signing below, I attest that I have the authority to authorize medication assistance for the patient and medications listed above. I understand that the cost center listed above will be cross-charged for the total cost of the drugs dispensed.   Pharmacy Instructions  Selam Daugherty authorized prescriptions listed above to third party plan [facility]  o ID: Loulou Maravilla last name  o Group: Cost center of the unit/department that authorized medication assistance   Retain this document for future reference

## 2022-07-19 NOTE — PROGRESS NOTES
Physician Progress Note      PATIENT:               Vita Hernandez  [facility] #:                  [id number]  :                       1985  ADMIT DATE:       2022 12:31 PM  Mason Dickinson DATE:        2022 7:01 PM  RESPONDING  PROVIDER #:        TRINA VINCENT DO          QUERY TEXT:    Patient admitted with BMI 43. If possible, please document in progress notes   and discharge summary if you are evaluating and /or treating any of the   following: The medical record reflects the following:  Risk Factors: BMI 43  Clinical Indicators: Ht. 5'5\"   Wt 261#  Treatment: Monitoring  Options provided:  -- Morbid obesity  -- Other - I will add my own diagnosis  -- Disagree - Not applicable / Not valid  -- Disagree - Clinically unable to determine / Unknown  -- Refer to Clinical Documentation Reviewer    PROVIDER RESPONSE TEXT:    This patient has morbid obesity. Query created by: Eliana Guo on 2022 6:42 AM      QUERY TEXT:    Pt admitted with Mediastinal  mass and noted to have surgical pathology   consistent with Mediastinal mass, core biopsy: -Hyalinizing fibrosis. If   possible, please document in progress notes and d/c summary a correlating   diagnosis to explain pathology findings:     The medical record reflects the following:  Risk Factors: Mediastinal mass  Clinical Indicators: per Pathology  \" Mediastinal mass, core biopsy:    -Hyalinizing fibrosis The findings raise a differential diagnosis of   sclerosing mediastinitis\"  Treatment: Biopsy  Options provided:  -- Mediastinal Hyalinizing fibrosis  -- Other - I will add my own diagnosis  -- Disagree - Not applicable / Not valid  -- Disagree - Clinically unable to determine / Unknown  -- Refer to Clinical Documentation Reviewer    PROVIDER RESPONSE TEXT:    Mass of uncertain etiology    Query created by: Eliana Guo on 2022 6:49 AM      Electronically signed by:  TRINA VINCENT DO 2022 1:51 PM

## 2022-07-21 NOTE — DISCHARGE SUMMARY
Hospital Medicine Discharge Summary    Patient: Catrachito Hilton     Gender: male  : 1985   Age: 29 y.o. MRN: [id number]    Code Status: Prior full code    Primary Care Provider: No primary care provider on file. Admit Date: 2022   Discharge Date: 2022      Admitting Physician: Imani Sloan DO  Discharge Physician: Imani Sloan DO     Discharge Diagnoses: Active Hospital Problems    Diagnosis Date Noted    Neck mass [R22.1] 2022     Priority: Medium       Hospital Course:   29 y.o. male with PMHx of depression presented to [facility] in transfer from [facility] for evaluation of left neck mass and chest mass. he also has a rash to the upper chest, neck and in the scalp which he noted about 2 weeks ago. the mass to the left side of his neck which has been present about 3 to 4 weeks. He denies pain to this area. No redness or warmth. No drainage. Patient has no chest pain or shortness of breath. No fever, chills or body aches. No recent weight loss. Oncology has been consulted    Work up completed, and improved with treatment as below. was discharged today in stable condition. Neck mass  - CT soft tissue neck: Amorphous soft tissue attenuation seen along the left jugular chain lymph nodes involving the left sternocleidomastoid muscle with mildly enlarged left jugular chain and left submental lymph nodes. Multiple abnormal appearing right supraclavicular lymph nodes. No discrete drainable fluid collection.   - CT chest: Abnormal soft tissue density seen in the mediastinum inseparable from the thymus gland raising a question of thymoma,  lymphoma would also be in the differential.  - monitor oxygen saturation and airway  - completed biopsy here , will follow up with Dr Alex Kaplan for pathology results  - consulted hem/onc  - some improvement with Lotrisone for his rash - continue after dc    Disposition:  Home    Exam:     /73   Pulse 98   Temp 99.1 °F (37.3 °C) (Oral)   Resp 16   Ht 5' 5\" (1.651 m)   Wt 261 lb 7.5 oz (118.6 kg)   SpO2 99%   BMI 43.51 kg/m²     Gen: alert, NAD  HEENT: NC/AT, moist mucous membranes, no oropharyngeal erythema or exudate  Neck: supple, trachea midline. moderate size firm mass on left neck. Nontender. Heart: Normal s1/s2, RRR, no murmurs, gallops, or rubs. Lungs: clear bilaterally, no wheezing, no rales, no rhonchi, no use of accessory muscles  Abd: bowel sounds present, soft, nontender, nondistended, no masses  Extrem: No clubbing, cyanosis, no edema  Skin: macular rash around the front and left neck  Psych: A & O x3, affect appropriate  Neuro: grossly intact, moves all four extremities spontaneously. Cap refill: +2 sec    Consults:     IP CONSULT TO HOSPITALIST  IP CONSULT TO ONCOLOGY  IP CONSULT TO SOCIAL WORK  IP CONSULT TO CARDIOTHORACIC SURGERY    Labs: For convenience and continuity at follow-up the following most recent labs are provided:    Lab Results   Component Value Date/Time    WBC 8.6 07/06/2022 06:24 AM    HGB 8.9 07/06/2022 06:24 AM    HCT 32.2 07/06/2022 09:46 AM    MCV 67.9 07/06/2022 06:24 AM     07/06/2022 06:24 AM     07/06/2022 06:24 AM    K 3.7 07/06/2022 06:24 AM    K 3.6 07/05/2022 01:46 PM     07/06/2022 06:24 AM    CO2 25 07/06/2022 06:24 AM    BUN 8 07/06/2022 06:24 AM    CREATININE 0.7 07/06/2022 06:24 AM    CALCIUM 8.7 07/06/2022 06:24 AM    ALKPHOS 60 07/06/2022 06:24 AM    ALT 6 07/06/2022 06:24 AM    AST 11 07/06/2022 06:24 AM    BILITOT <0.2 07/06/2022 06:24 AM    LABALBU 3.1 07/06/2022 06:24 AM     Lab Results   Component Value Date    INR 1.21 (H) 07/06/2022       Radiology:  XR CHEST 1 VIEW   Final Result   No pneumothorax is seen status post biopsy. CT BIOPSY LYMPH NODES SUPERFICIAL   Final Result   Successful CT guided core biopsy anterior mediastinal mass.       RECOMMENDATIONS:   Unavailable         CT GUIDED NEEDLE PLACEMENT   Final Result   Successful CT guided is in conjunction with any daily progress note from day of discharge. Time Spent on discharge is 45 minutes  in the examination, evaluation, counseling and review of medications and discharge plan. Signed:    Kristin Anderson DO   7/21/2022      Thank you No primary care provider on file. for the opportunity to be involved in this patient's care. If you have any questions or concerns please feel free to contact me at [phone].

## 2022-07-29 ENCOUNTER — TELEPHONE (OUTPATIENT)
Dept: CARDIOTHORACIC SURGERY | Age: 37
End: 2022-07-29

## 2022-07-29 NOTE — TELEPHONE ENCOUNTER
Called and left a message for patient that Dr. Otto Scohfield would be discussing his final pathology results with Dr. Harvey Crandall and then someone would contact him about the results. I left our contact information for him to call back with any questions or concerns. Javy Craft RN    Patient returned my call and is understanding of the plan.      Javy Craft RN